# Patient Record
Sex: FEMALE | Race: BLACK OR AFRICAN AMERICAN | NOT HISPANIC OR LATINO | URBAN - METROPOLITAN AREA
[De-identification: names, ages, dates, MRNs, and addresses within clinical notes are randomized per-mention and may not be internally consistent; named-entity substitution may affect disease eponyms.]

---

## 2018-04-10 ENCOUNTER — EMERGENCY (EMERGENCY)
Facility: HOSPITAL | Age: 23
LOS: 0 days | Discharge: HOME | End: 2018-04-11
Attending: EMERGENCY MEDICINE | Admitting: INTERNAL MEDICINE

## 2018-04-10 VITALS
OXYGEN SATURATION: 98 % | HEART RATE: 93 BPM | RESPIRATION RATE: 20 BRPM | TEMPERATURE: 98 F | DIASTOLIC BLOOD PRESSURE: 56 MMHG | SYSTOLIC BLOOD PRESSURE: 122 MMHG

## 2018-04-10 DIAGNOSIS — R10.84 GENERALIZED ABDOMINAL PAIN: ICD-10-CM

## 2018-04-10 DIAGNOSIS — R74.0 NONSPECIFIC ELEVATION OF LEVELS OF TRANSAMINASE AND LACTIC ACID DEHYDROGENASE [LDH]: ICD-10-CM

## 2018-04-10 LAB
ALBUMIN SERPL ELPH-MCNC: 4.4 G/DL — SIGNIFICANT CHANGE UP (ref 3.5–5.2)
ALP SERPL-CCNC: 76 U/L — SIGNIFICANT CHANGE UP (ref 30–115)
ALT FLD-CCNC: 130 U/L — HIGH (ref 0–41)
ANION GAP SERPL CALC-SCNC: 13 MMOL/L — SIGNIFICANT CHANGE UP (ref 7–14)
APPEARANCE UR: (no result)
AST SERPL-CCNC: 222 U/L — HIGH (ref 0–41)
BACTERIA # UR AUTO: (no result) /HPF
BASOPHILS # BLD AUTO: 0.02 K/UL — SIGNIFICANT CHANGE UP (ref 0–0.2)
BASOPHILS NFR BLD AUTO: 0.3 % — SIGNIFICANT CHANGE UP (ref 0–1)
BILIRUB SERPL-MCNC: 1.4 MG/DL — HIGH (ref 0.2–1.2)
BILIRUB UR-MCNC: (no result)
BUN SERPL-MCNC: 11 MG/DL — SIGNIFICANT CHANGE UP (ref 10–20)
CALCIUM SERPL-MCNC: 9.1 MG/DL — SIGNIFICANT CHANGE UP (ref 8.5–10.1)
CHLORIDE SERPL-SCNC: 101 MMOL/L — SIGNIFICANT CHANGE UP (ref 98–110)
CO2 SERPL-SCNC: 27 MMOL/L — SIGNIFICANT CHANGE UP (ref 17–32)
COLOR SPEC: (no result)
CREAT SERPL-MCNC: 0.6 MG/DL — LOW (ref 0.7–1.5)
DIFF PNL FLD: NEGATIVE — SIGNIFICANT CHANGE UP
EOSINOPHIL # BLD AUTO: 0.2 K/UL — SIGNIFICANT CHANGE UP (ref 0–0.7)
EOSINOPHIL NFR BLD AUTO: 3.4 % — SIGNIFICANT CHANGE UP (ref 0–8)
EPI CELLS # UR: (no result) /HPF
GLUCOSE SERPL-MCNC: 96 MG/DL — SIGNIFICANT CHANGE UP (ref 70–99)
GLUCOSE UR QL: NEGATIVE — SIGNIFICANT CHANGE UP
HCT VFR BLD CALC: 42.6 % — SIGNIFICANT CHANGE UP (ref 37–47)
HGB BLD-MCNC: 13.8 G/DL — SIGNIFICANT CHANGE UP (ref 12–16)
IMM GRANULOCYTES NFR BLD AUTO: 0.2 % — SIGNIFICANT CHANGE UP (ref 0.1–0.3)
KETONES UR-MCNC: 15
LEUKOCYTE ESTERASE UR-ACNC: (no result)
LIDOCAIN IGE QN: 19 U/L — SIGNIFICANT CHANGE UP (ref 7–60)
LYMPHOCYTES # BLD AUTO: 1.88 K/UL — SIGNIFICANT CHANGE UP (ref 1.2–3.4)
LYMPHOCYTES # BLD AUTO: 31.8 % — SIGNIFICANT CHANGE UP (ref 20.5–51.1)
MCHC RBC-ENTMCNC: 27.2 PG — SIGNIFICANT CHANGE UP (ref 27–31)
MCHC RBC-ENTMCNC: 32.4 G/DL — SIGNIFICANT CHANGE UP (ref 32–37)
MCV RBC AUTO: 83.9 FL — SIGNIFICANT CHANGE UP (ref 81–99)
MONOCYTES # BLD AUTO: 0.51 K/UL — SIGNIFICANT CHANGE UP (ref 0.1–0.6)
MONOCYTES NFR BLD AUTO: 8.6 % — SIGNIFICANT CHANGE UP (ref 1.7–9.3)
NEUTROPHILS # BLD AUTO: 3.3 K/UL — SIGNIFICANT CHANGE UP (ref 1.4–6.5)
NEUTROPHILS NFR BLD AUTO: 55.7 % — SIGNIFICANT CHANGE UP (ref 42.2–75.2)
NITRITE UR-MCNC: NEGATIVE — SIGNIFICANT CHANGE UP
NRBC # BLD: 0 /100 WBCS — SIGNIFICANT CHANGE UP (ref 0–0)
PH UR: 7 — SIGNIFICANT CHANGE UP (ref 5–8)
PLATELET # BLD AUTO: 322 K/UL — SIGNIFICANT CHANGE UP (ref 130–400)
POTASSIUM SERPL-MCNC: 4.1 MMOL/L — SIGNIFICANT CHANGE UP (ref 3.5–5)
POTASSIUM SERPL-SCNC: 4.1 MMOL/L — SIGNIFICANT CHANGE UP (ref 3.5–5)
PROT SERPL-MCNC: 7.3 G/DL — SIGNIFICANT CHANGE UP (ref 6–8)
PROT UR-MCNC: 30
RBC # BLD: 5.08 M/UL — SIGNIFICANT CHANGE UP (ref 4.2–5.4)
RBC # FLD: 12.5 % — SIGNIFICANT CHANGE UP (ref 11.5–14.5)
SODIUM SERPL-SCNC: 141 MMOL/L — SIGNIFICANT CHANGE UP (ref 135–146)
SP GR SPEC: >=1.03 — SIGNIFICANT CHANGE UP (ref 1.01–1.03)
UROBILINOGEN FLD QL: 1 (ref 0.2–0.2)
WBC # BLD: 5.92 K/UL — SIGNIFICANT CHANGE UP (ref 4.8–10.8)
WBC # FLD AUTO: 5.92 K/UL — SIGNIFICANT CHANGE UP (ref 4.8–10.8)
WBC UR QL: (no result) /HPF

## 2018-04-10 RX ORDER — ONDANSETRON 8 MG/1
4 TABLET, FILM COATED ORAL ONCE
Qty: 0 | Refills: 0 | Status: COMPLETED | OUTPATIENT
Start: 2018-04-10 | End: 2018-04-10

## 2018-04-10 RX ORDER — KETOROLAC TROMETHAMINE 30 MG/ML
30 SYRINGE (ML) INJECTION ONCE
Qty: 0 | Refills: 0 | Status: DISCONTINUED | OUTPATIENT
Start: 2018-04-10 | End: 2018-04-10

## 2018-04-10 RX ADMIN — Medication 30 MILLIGRAM(S): at 23:24

## 2018-04-10 RX ADMIN — ONDANSETRON 4 MILLIGRAM(S): 8 TABLET, FILM COATED ORAL at 22:33

## 2018-04-10 RX ADMIN — Medication 20 MILLIGRAM(S): at 22:33

## 2018-04-10 RX ADMIN — Medication 30 MILLILITER(S): at 23:24

## 2018-04-10 NOTE — ED ADULT TRIAGE NOTE - CHIEF COMPLAINT QUOTE
pt c/o abdominal pain intermittent since her c section in may 2017 worsened 2 weeks ago. 1 episode of vomiting last night. No fever

## 2018-04-11 LAB — HCG SERPL-ACNC: <0.6 MIU/ML — SIGNIFICANT CHANGE UP (ref 0–5)

## 2018-04-11 NOTE — ED PROVIDER NOTE - MEDICAL DECISION MAKING DETAILS
Patient here for assessment of chronic, waxing and waning abdominal pain -- looks, well has soft abdomen -- will check labs, give GI cocktail and reassess

## 2018-04-11 NOTE — ED PROVIDER NOTE - OBJECTIVE STATEMENT
24 yo F, no chronic medical history, here for assessment of abdominal pain, described as diffuse and cramping -- sx waxing and waning since 5/2017. Occasionally associated with nausea, vomiting, recently with loose stools. Non bloody, no mucous.     Currently patient is pain free.

## 2018-04-11 NOTE — ED PROVIDER NOTE - NS ED ROS FT
Constitutional: no fever, chills, no recent weight loss, change in appetite or malaise  Cardiac: No chest pain, SOB or edema.  Respiratory: No cough or respiratory distress  GI: see HPI  : No dysuria, frequency, urgency or hematuria  MS: no pain to back or extremities, no loss of ROM, no weakness  Neuro: No headache or weakness. No LOC.  Skin: No skin rash.  Except as documented in the HPI, all other systems are negative.

## 2018-04-11 NOTE — ED PROVIDER NOTE - PROGRESS NOTE DETAILS
Labs notable for transaminitis only -- normal bili, normal alk phos, normal lipase, patient continues to have non tender abdomen, feels well after non narcotic analgesia. No signs of acute hepatitis. Will dc home with GI follow up for transaminitis, chronic abdominal pain. Discussed LFTs with patient -- she has a history of this in the past, was told she had to follow up with GI and she didnt

## 2018-04-11 NOTE — ED PROVIDER NOTE - PHYSICAL EXAMINATION
VITAL SIGNS: I have reviewed nursing notes and confirm.  CONSTITUTIONAL: Well-developed; well-nourished; in no acute distress.  SKIN: Skin exam is warm and dry, no acute rash.  HEAD: Normocephalic; atraumatic.  EYES: PERRL, EOM intact; conjunctiva and sclera clear.  ENT: No nasal discharge; airway clear  NECK: Supple; non tender.  CARD: Regular rate and rhythm.  RESP: No wheezes, rales or rhonchi.  ABD: Normal bowel sounds; soft; non-distended; non-tender; obese, no rebound, no guarding  EXT: Normal ROM. No clubbing, cyanosis or edema.  NEURO: Alert, oriented. Grossly unremarkable. No focal deficits.  PSYCH: Cooperative, appropriate.

## 2018-09-29 ENCOUNTER — EMERGENCY (EMERGENCY)
Facility: HOSPITAL | Age: 23
LOS: 0 days | Discharge: HOME | End: 2018-09-29
Admitting: PHYSICIAN ASSISTANT

## 2018-09-29 VITALS
DIASTOLIC BLOOD PRESSURE: 60 MMHG | SYSTOLIC BLOOD PRESSURE: 126 MMHG | HEART RATE: 88 BPM | TEMPERATURE: 98 F | RESPIRATION RATE: 20 BRPM | OXYGEN SATURATION: 97 %
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
## 2018-09-29 DIAGNOSIS — M54.2 CERVICALGIA: ICD-10-CM

## 2018-09-29 DIAGNOSIS — Z91.018 ALLERGY TO OTHER FOODS: ICD-10-CM
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
## 2018-09-29 DIAGNOSIS — Z91.013 ALLERGY TO SEAFOOD: ICD-10-CM
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
## 2018-09-29 RX ORDER — METHOCARBAMOL 500 MG/1
750 TABLET, FILM COATED ORAL ONCE
Qty: 0 | Refills: 0 | Status: COMPLETED | OUTPATIENT
Start: 2018-09-29 | End: 2018-09-29

## 2018-09-29 RX ORDER — METHOCARBAMOL 500 MG/1
1 TABLET, FILM COATED ORAL
Qty: 15 | Refills: 0
Start: 2018-09-29 | End: 2018-10-03

## 2018-09-29 RX ADMIN — METHOCARBAMOL 750 MILLIGRAM(S): 500 TABLET, FILM COATED ORAL at 18:01

## 2018-09-29 NOTE — ED PROVIDER NOTE - NS ED ROS FT
Review of Systems:  	•	CONSTITUTIONAL - no fever, no diaphoresis, no chills  	•	SKIN - no rash  	•	EYES - no eye pain, no blurry vision  	•	ENT - no change in hearing, no sore throat, no ear pain or tinnitus  	•	RESPIRATORY - no shortness of breath, no cough  	•	CARDIAC - no chest pain, no palpitations  	•	GI - no abd pain, no nausea, no vomiting, no diarrhea, no constipation  	•	GENITO-URINARY - no discharge, no dysuria; no hematuria, no increased urinary frequency  	•	MUSCULOSKELETAL - + neck pain   	•	NEUROLOGIC - no weakness, no headache, no paresthesias, no LOC
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
## 2018-09-29 NOTE — ED PROVIDER NOTE - PHYSICAL EXAMINATION
CONST: Well appearing in NAD  EYES: PERRL, EOMI, Sclera and conjunctiva clear.   ENT: No nasal discharge. TM's clear B/L without drainage. Oropharynx normal appearing, no erythema or exudates. Uvula midline.  NECK: + tenderness to left paraspinal , no spinal tenderness, normal ROM  CARD: Normal S1 S2; Normal rate and rhythm  RESP: Equal BS B/L, No wheezes, rhonchi or rales. No distress  GI: Soft, non-tender, non-distended.  MS: + tenderness to left trapezius, pulses 2 +, Normal ROM in all extremities. No midline spinal tenderness.  SKIN: Warm, dry, no acute rashes. Good turgor  NEURO: A&Ox3, No focal deficits. Strength 5/5 with no sensory deficits. Steady gait
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
## 2018-12-03 ENCOUNTER — OUTPATIENT (OUTPATIENT)
Dept: OUTPATIENT SERVICES | Facility: HOSPITAL | Age: 23
LOS: 1 days | Discharge: HOME | End: 2018-12-03

## 2018-12-03 DIAGNOSIS — M54.2 CERVICALGIA: ICD-10-CM

## 2018-12-03 DIAGNOSIS — M54.5 LOW BACK PAIN: ICD-10-CM

## 2019-04-27 ENCOUNTER — TRANSCRIPTION ENCOUNTER (OUTPATIENT)
Age: 24
End: 2019-04-27

## 2019-08-09 ENCOUNTER — TRANSCRIPTION ENCOUNTER (OUTPATIENT)
Age: 24
End: 2019-08-09

## 2019-08-17 ENCOUNTER — EMERGENCY (EMERGENCY)
Facility: HOSPITAL | Age: 24
LOS: 0 days | Discharge: HOME | End: 2019-08-18
Attending: EMERGENCY MEDICINE | Admitting: EMERGENCY MEDICINE
Payer: COMMERCIAL

## 2019-08-17 DIAGNOSIS — R00.0 TACHYCARDIA, UNSPECIFIED: ICD-10-CM

## 2019-08-17 DIAGNOSIS — R50.9 FEVER, UNSPECIFIED: ICD-10-CM

## 2019-08-17 DIAGNOSIS — R53.1 WEAKNESS: ICD-10-CM

## 2019-08-17 PROCEDURE — 99284 EMERGENCY DEPT VISIT MOD MDM: CPT

## 2019-08-17 PROCEDURE — 99053 MED SERV 10PM-8AM 24 HR FAC: CPT

## 2019-08-18 VITALS
RESPIRATION RATE: 18 BRPM | TEMPERATURE: 104 F | SYSTOLIC BLOOD PRESSURE: 120 MMHG | OXYGEN SATURATION: 98 % | HEART RATE: 115 BPM | DIASTOLIC BLOOD PRESSURE: 74 MMHG

## 2019-08-18 VITALS
OXYGEN SATURATION: 99 % | TEMPERATURE: 103 F | WEIGHT: 293 LBS | DIASTOLIC BLOOD PRESSURE: 64 MMHG | HEIGHT: 62 IN | HEART RATE: 104 BPM | RESPIRATION RATE: 18 BRPM | SYSTOLIC BLOOD PRESSURE: 138 MMHG

## 2019-08-18 LAB
ALBUMIN SERPL ELPH-MCNC: 4.2 G/DL — SIGNIFICANT CHANGE UP (ref 3.5–5.2)
ALP SERPL-CCNC: 58 U/L — SIGNIFICANT CHANGE UP (ref 30–115)
ALT FLD-CCNC: 15 U/L — SIGNIFICANT CHANGE UP (ref 0–41)
ANION GAP SERPL CALC-SCNC: 13 MMOL/L — SIGNIFICANT CHANGE UP (ref 7–14)
APPEARANCE UR: CLEAR — SIGNIFICANT CHANGE UP
AST SERPL-CCNC: 25 U/L — SIGNIFICANT CHANGE UP (ref 0–41)
BACTERIA # UR AUTO: ABNORMAL
BASOPHILS # BLD AUTO: 0.02 K/UL — SIGNIFICANT CHANGE UP (ref 0–0.2)
BASOPHILS NFR BLD AUTO: 0.4 % — SIGNIFICANT CHANGE UP (ref 0–1)
BILIRUB SERPL-MCNC: <0.2 MG/DL — SIGNIFICANT CHANGE UP (ref 0.2–1.2)
BILIRUB UR-MCNC: NEGATIVE — SIGNIFICANT CHANGE UP
BUN SERPL-MCNC: 11 MG/DL — SIGNIFICANT CHANGE UP (ref 10–20)
CALCIUM SERPL-MCNC: 9.3 MG/DL — SIGNIFICANT CHANGE UP (ref 8.5–10.1)
CHLORIDE SERPL-SCNC: 102 MMOL/L — SIGNIFICANT CHANGE UP (ref 98–110)
CO2 SERPL-SCNC: 22 MMOL/L — SIGNIFICANT CHANGE UP (ref 17–32)
COLOR SPEC: SIGNIFICANT CHANGE UP
CREAT SERPL-MCNC: 0.8 MG/DL — SIGNIFICANT CHANGE UP (ref 0.7–1.5)
DIFF PNL FLD: SIGNIFICANT CHANGE UP
EOSINOPHIL # BLD AUTO: 0.07 K/UL — SIGNIFICANT CHANGE UP (ref 0–0.7)
EOSINOPHIL NFR BLD AUTO: 1.3 % — SIGNIFICANT CHANGE UP (ref 0–8)
EPI CELLS # UR: 4 /HPF — SIGNIFICANT CHANGE UP (ref 0–5)
GLUCOSE SERPL-MCNC: 117 MG/DL — HIGH (ref 70–99)
GLUCOSE UR QL: NEGATIVE — SIGNIFICANT CHANGE UP
HCG SERPL-ACNC: <0.6 MIU/ML — SIGNIFICANT CHANGE UP
HCT VFR BLD CALC: 40.7 % — SIGNIFICANT CHANGE UP (ref 37–47)
HGB BLD-MCNC: 13.4 G/DL — SIGNIFICANT CHANGE UP (ref 12–16)
HYALINE CASTS # UR AUTO: 0 /LPF — SIGNIFICANT CHANGE UP (ref 0–7)
IMM GRANULOCYTES NFR BLD AUTO: 0.2 % — SIGNIFICANT CHANGE UP (ref 0.1–0.3)
KETONES UR-MCNC: NEGATIVE — SIGNIFICANT CHANGE UP
LACTATE SERPL-SCNC: 1 MMOL/L — SIGNIFICANT CHANGE UP (ref 0.5–2.2)
LEUKOCYTE ESTERASE UR-ACNC: ABNORMAL
LYMPHOCYTES # BLD AUTO: 0.89 K/UL — LOW (ref 1.2–3.4)
LYMPHOCYTES # BLD AUTO: 17 % — LOW (ref 20.5–51.1)
MCHC RBC-ENTMCNC: 27.1 PG — SIGNIFICANT CHANGE UP (ref 27–31)
MCHC RBC-ENTMCNC: 32.9 G/DL — SIGNIFICANT CHANGE UP (ref 32–37)
MCV RBC AUTO: 82.4 FL — SIGNIFICANT CHANGE UP (ref 81–99)
MONOCYTES # BLD AUTO: 0.42 K/UL — SIGNIFICANT CHANGE UP (ref 0.1–0.6)
MONOCYTES NFR BLD AUTO: 8 % — SIGNIFICANT CHANGE UP (ref 1.7–9.3)
NEUTROPHILS # BLD AUTO: 3.83 K/UL — SIGNIFICANT CHANGE UP (ref 1.4–6.5)
NEUTROPHILS NFR BLD AUTO: 73.1 % — SIGNIFICANT CHANGE UP (ref 42.2–75.2)
NITRITE UR-MCNC: NEGATIVE — SIGNIFICANT CHANGE UP
NRBC # BLD: 0 /100 WBCS — SIGNIFICANT CHANGE UP (ref 0–0)
PH UR: 7.5 — SIGNIFICANT CHANGE UP (ref 5–8)
PLATELET # BLD AUTO: 262 K/UL — SIGNIFICANT CHANGE UP (ref 130–400)
POTASSIUM SERPL-MCNC: 4.4 MMOL/L — SIGNIFICANT CHANGE UP (ref 3.5–5)
POTASSIUM SERPL-SCNC: 4.4 MMOL/L — SIGNIFICANT CHANGE UP (ref 3.5–5)
PROT SERPL-MCNC: 7.2 G/DL — SIGNIFICANT CHANGE UP (ref 6–8)
PROT UR-MCNC: NEGATIVE — SIGNIFICANT CHANGE UP
RBC # BLD: 4.94 M/UL — SIGNIFICANT CHANGE UP (ref 4.2–5.4)
RBC # FLD: 12.6 % — SIGNIFICANT CHANGE UP (ref 11.5–14.5)
RBC CASTS # UR COMP ASSIST: 1 /HPF — SIGNIFICANT CHANGE UP (ref 0–4)
SODIUM SERPL-SCNC: 137 MMOL/L — SIGNIFICANT CHANGE UP (ref 135–146)
SP GR SPEC: 1.02 — SIGNIFICANT CHANGE UP (ref 1.01–1.02)
UROBILINOGEN FLD QL: SIGNIFICANT CHANGE UP
WBC # BLD: 5.24 K/UL — SIGNIFICANT CHANGE UP (ref 4.8–10.8)
WBC # FLD AUTO: 5.24 K/UL — SIGNIFICANT CHANGE UP (ref 4.8–10.8)
WBC UR QL: 6 /HPF — HIGH (ref 0–5)

## 2019-08-18 PROCEDURE — 71046 X-RAY EXAM CHEST 2 VIEWS: CPT | Mod: 26

## 2019-08-18 PROCEDURE — 93010 ELECTROCARDIOGRAM REPORT: CPT

## 2019-08-18 RX ORDER — IBUPROFEN 200 MG
800 TABLET ORAL ONCE
Refills: 0 | Status: COMPLETED | OUTPATIENT
Start: 2019-08-18 | End: 2019-08-18

## 2019-08-18 RX ORDER — ACETAMINOPHEN 500 MG
975 TABLET ORAL ONCE
Refills: 0 | Status: COMPLETED | OUTPATIENT
Start: 2019-08-18 | End: 2019-08-18

## 2019-08-18 RX ORDER — SODIUM CHLORIDE 9 MG/ML
4400 INJECTION, SOLUTION INTRAVENOUS ONCE
Refills: 0 | Status: DISCONTINUED | OUTPATIENT
Start: 2019-08-18 | End: 2019-08-18

## 2019-08-18 RX ADMIN — Medication 800 MILLIGRAM(S): at 01:35

## 2019-08-18 RX ADMIN — Medication 975 MILLIGRAM(S): at 03:11

## 2019-08-18 RX ADMIN — Medication 800 MILLIGRAM(S): at 00:58

## 2019-08-18 NOTE — ED PROVIDER NOTE - OBJECTIVE STATEMENT
Pt is a 25y/o female presents today for eval of fever and generalized weakness x 3 days. Pt sts she hasn't taken anything for her fever. Pt denies fever, chills, recent travel, dysuria, hematuria, n/v/d, CP, SOB, Leg swelling, abd pain.

## 2019-08-18 NOTE — ED PROVIDER NOTE - CLINICAL SUMMARY MEDICAL DECISION MAKING FREE TEXT BOX
evaluated for fever, well appearing, labs nml without signs of sepsis. VS improved, likely viral illness.

## 2019-08-18 NOTE — ED PROVIDER NOTE - NS ED ROS FT
Eyes:  No visual changes, eye pain or discharge.  ENMT:  No hearing changes, pain, discharge or infections. No neck pain or stiffness.  Cardiac:  No chest pain, SOB or edema. No chest pain with exertion.  Respiratory:  No cough or respiratory distress. No hemoptysis. No history of asthma or RAD.  GI:  No nausea, vomiting, diarrhea or abdominal pain.  :  No dysuria, frequency or burning.  MS:  No myalgia, muscle weakness, joint pain or back pain.  Neuro:  No headache or weakness.  No LOC.  Skin:  No skin rash.   Endocrine: No history of thyroid disease or diabetes.  Except as documented in the HPI,  all other systems are negative.

## 2019-08-18 NOTE — ED PROVIDER NOTE - PHYSICAL EXAMINATION
VITAL SIGNS: I have reviewed nursing notes and confirm.  CONSTITUTIONAL: Well-developed; well-nourished; in no acute distress.   SKIN: skin exam is warm and dry, no acute rash.    HEAD: Normocephalic; atraumatic.  EYES: conjunctiva and sclera clear.  ENT: No nasal discharge; airway clear.  CARD: S1, S2 normal; no murmurs, gallops, or rubs. Regular rate and rhythm.   RESP: No wheezes, rales or rhonchi.  ABD: Normal bowel sounds; soft; non-distended; non-tender  EXT: Normal ROM.  No clubbing, cyanosis or edema.   LYMPH: No acute cervical adenopathy.  NEURO: Alert, oriented, grossly unremarkable

## 2019-08-18 NOTE — ED PROVIDER NOTE - NSFOLLOWUPINSTRUCTIONS_ED_ALL_ED_FT
Fever, Adult    A fever is an increase in the body's temperature. It is usually defined as a temperature of 100.4°F (38°C) or higher. Brief mild or moderate fevers generally have no long-term effects, and they often do not require treatment. Moderate or high fevers may make you feel uncomfortable and can sometimes be a sign of a serious illness or disease. The sweating that may occur with repeated or prolonged fever may also cause dehydration.    Fever is confirmed by taking a temperature with a thermometer. A measured temperature can vary with:    Age.  Time of day.  Location of the thermometer:  Mouth (oral).  Rectum (rectal).  Ear (tympanic).  Underarm (axillary).  Forehead (temporal).    HOME CARE INSTRUCTIONS  Pay attention to any changes in your symptoms. Take these actions to help with your condition:    Take over-the counter and prescription medicines only as told by your health care provider. Follow the dosing instructions carefully.  If you were prescribed an antibiotic medicine, take it as told by your health care provider. Do not stop taking the antibiotic even if you start to feel better.  Rest as needed.  Drink enough fluid to keep your urine clear or pale yellow. This helps to prevent dehydration.  Sponge yourself or bathe with room-temperature water to help reduce your body temperature as needed. Do not use ice water.   Do not overbundle yourself in blankets or heavy clothes.    SEEK MEDICAL CARE IF:  You cannot eat or drink without vomiting.  You have pain when you urinate.  Your symptoms do not improve with treatment.  You develop new symptoms.  You develop excessive weakness.    SEEK IMMEDIATE MEDICAL CARE IF:  You have shortness of breath or have trouble breathing.  You are dizzy or you faint.  You are disoriented or confused.  You develop signs of dehydration, such as a dry mouth, decreased urination, or paleness.  You develop severe pain in your abdomen.  You have persistent vomiting or diarrhea.  You develop a skin rash.  Your symptoms suddenly get worse.    ADDITIONAL NOTES AND INSTRUCTIONS    Please follow up with your Primary MD in 24-48 hr.  Seek immediate medical care for any new/worsening signs or symptoms

## 2019-08-18 NOTE — ED PROVIDER NOTE - ATTENDING CONTRIBUTION TO CARE
fever for 3 days, with mild sore throat and dizziness, otherwise acting baseline, no other cough, vomiting/diarrhea but has some nausea. on exam tachy and febrile, well appearing, throat nml, lungs cta, abd soft, heart nml. plan is to hydrate, check labs and reasses.

## 2019-08-19 LAB
CULTURE RESULTS: SIGNIFICANT CHANGE UP
SPECIMEN SOURCE: SIGNIFICANT CHANGE UP

## 2019-10-08 ENCOUNTER — TRANSCRIPTION ENCOUNTER (OUTPATIENT)
Age: 24
End: 2019-10-08

## 2020-08-25 ENCOUNTER — TRANSCRIPTION ENCOUNTER (OUTPATIENT)
Age: 25
End: 2020-08-25

## 2021-10-23 ENCOUNTER — TRANSCRIPTION ENCOUNTER (OUTPATIENT)
Age: 26
End: 2021-10-23

## 2022-04-27 ENCOUNTER — TRANSCRIPTION ENCOUNTER (OUTPATIENT)
Age: 27
End: 2022-04-27

## 2022-10-13 ENCOUNTER — NON-APPOINTMENT (OUTPATIENT)
Age: 27
End: 2022-10-13

## 2023-02-28 ENCOUNTER — LABORATORY RESULT (OUTPATIENT)
Age: 28
End: 2023-02-28

## 2023-03-01 ENCOUNTER — APPOINTMENT (OUTPATIENT)
Dept: OBGYN | Facility: CLINIC | Age: 28
End: 2023-03-01
Payer: MEDICAID

## 2023-03-01 VITALS — HEIGHT: 62 IN | BODY MASS INDEX: 51.16 KG/M2 | WEIGHT: 278 LBS

## 2023-03-01 PROBLEM — Z00.00 ENCOUNTER FOR PREVENTIVE HEALTH EXAMINATION: Status: ACTIVE | Noted: 2023-03-01

## 2023-03-01 LAB
BILIRUB UR QL STRIP: NEGATIVE
CLARITY UR: CLEAR
COLLECTION METHOD: NORMAL
GLUCOSE UR-MCNC: NEGATIVE
HCG UR QL: 0.2 EU/DL
HGB UR QL STRIP.AUTO: NEGATIVE
KETONES UR-MCNC: NEGATIVE
LEUKOCYTE ESTERASE UR QL STRIP: NEGATIVE
NITRITE UR QL STRIP: NEGATIVE
PH UR STRIP: 6
PROT UR STRIP-MCNC: NEGATIVE
SP GR UR STRIP: 1.02

## 2023-03-01 PROCEDURE — 0500F INITIAL PRENATAL CARE VISIT: CPT

## 2023-03-07 LAB
CYTOLOGY CVX/VAG DOC THIN PREP: NORMAL
GP B STREP DNA SPEC QL NAA+PROBE: NOT DETECTED
SOURCE GBS: NORMAL

## 2023-03-15 ENCOUNTER — NON-APPOINTMENT (OUTPATIENT)
Age: 28
End: 2023-03-15

## 2023-03-31 ENCOUNTER — NON-APPOINTMENT (OUTPATIENT)
Age: 28
End: 2023-03-31

## 2023-04-03 ENCOUNTER — NON-APPOINTMENT (OUTPATIENT)
Age: 28
End: 2023-04-03

## 2023-04-04 LAB
ABO + RH PNL BLD: NORMAL
BASOPHILS # BLD AUTO: 0.03 K/UL
BASOPHILS NFR BLD AUTO: 0.4 %
BLD GP AB SCN SERPL QL: NORMAL
EOSINOPHIL # BLD AUTO: 0.14 K/UL
EOSINOPHIL NFR BLD AUTO: 1.7 %
HCT VFR BLD CALC: 37.7 %
HGB BLD-MCNC: 12.4 G/DL
IMM GRANULOCYTES NFR BLD AUTO: 0.6 %
LYMPHOCYTES # BLD AUTO: 2.16 K/UL
LYMPHOCYTES NFR BLD AUTO: 26.4 %
MAN DIFF?: NORMAL
MCHC RBC-ENTMCNC: 28.5 PG
MCHC RBC-ENTMCNC: 32.9 G/DL
MCV RBC AUTO: 86.7 FL
MONOCYTES # BLD AUTO: 0.57 K/UL
MONOCYTES NFR BLD AUTO: 7 %
NEUTROPHILS # BLD AUTO: 5.22 K/UL
NEUTROPHILS NFR BLD AUTO: 63.9 %
PLATELET # BLD AUTO: 284 K/UL
RBC # BLD: 4.35 M/UL
RBC # FLD: 12.7 %
WBC # FLD AUTO: 8.17 K/UL

## 2023-04-05 ENCOUNTER — APPOINTMENT (OUTPATIENT)
Dept: OBGYN | Facility: CLINIC | Age: 28
End: 2023-04-05
Payer: MEDICAID

## 2023-04-05 VITALS
SYSTOLIC BLOOD PRESSURE: 122 MMHG | DIASTOLIC BLOOD PRESSURE: 74 MMHG | BODY MASS INDEX: 51.71 KG/M2 | HEART RATE: 110 BPM | WEIGHT: 281 LBS | TEMPERATURE: 98.6 F | HEIGHT: 62 IN | OXYGEN SATURATION: 98 %

## 2023-04-05 PROCEDURE — 0502F SUBSEQUENT PRENATAL CARE: CPT

## 2023-04-06 ENCOUNTER — NON-APPOINTMENT (OUTPATIENT)
Age: 28
End: 2023-04-06

## 2023-04-12 ENCOUNTER — NON-APPOINTMENT (OUTPATIENT)
Age: 28
End: 2023-04-12

## 2023-04-12 LAB — PROGEST SERPL-MCNC: 11.6 NG/ML

## 2023-04-12 RX ORDER — PROGESTERONE 200 MG/1
200 CAPSULE ORAL
Qty: 30 | Refills: 2 | Status: ACTIVE | COMMUNITY
Start: 2023-04-12 | End: 1900-01-01

## 2023-04-19 ENCOUNTER — NON-APPOINTMENT (OUTPATIENT)
Age: 28
End: 2023-04-19

## 2023-04-19 LAB
BACTERIA UR CULT: NORMAL
HBV SURFACE AG SER QL: NONREACTIVE
HCV AB SER QL: NONREACTIVE
HCV S/CO RATIO: 0.1 S/CO
HGB A MFR BLD: 97.3 %
HGB A2 MFR BLD: 2.7 %
HGB FRACT BLD-IMP: NORMAL
HIV1+2 AB SPEC QL IA.RAPID: NONREACTIVE
LEAD BLD-MCNC: <1 UG/DL
MEV IGG FLD QL IA: 82.9 AU/ML
MEV IGG+IGM SER-IMP: POSITIVE
RUBV IGG FLD-ACNC: 1.4 INDEX
RUBV IGG SER-IMP: POSITIVE
T PALLIDUM AB SER QL IA: NEGATIVE
VZV AB TITR SER: POSITIVE
VZV IGG SER IF-ACNC: 230.8 INDEX

## 2023-04-20 ENCOUNTER — NON-APPOINTMENT (OUTPATIENT)
Age: 28
End: 2023-04-20

## 2023-04-24 LAB
CHROMOSOME13 INTERPRETATION: NORMAL
CHROMOSOME13 TEST RESULT: NORMAL
CHROMOSOME18 INTERPRETATION: NORMAL
CHROMOSOME18 TEST RESULT: NORMAL
CHROMOSOME21 INTERPRETATION: NORMAL
CHROMOSOME21 TEST RESULT: NORMAL
FETAL FRACTION: NORMAL
MICRODELETIONS INTERPRETATION: NORMAL
MICRODELETIONS RESULT: NORMAL
PERFORMANCE AND LIMITATIONS: NORMAL
SEX CHROMOSOME INTERPRETATION: NORMAL
SEX CHROMOSOME TEST RESULT: NORMAL
VERIFI WITH MICRODELETIONS: NOT DETECTED

## 2023-05-02 ENCOUNTER — APPOINTMENT (OUTPATIENT)
Dept: OBGYN | Facility: CLINIC | Age: 28
End: 2023-05-02
Payer: MEDICAID

## 2023-05-02 VITALS
HEIGHT: 62 IN | BODY MASS INDEX: 51.71 KG/M2 | DIASTOLIC BLOOD PRESSURE: 60 MMHG | HEART RATE: 98 BPM | TEMPERATURE: 97.8 F | WEIGHT: 281 LBS | OXYGEN SATURATION: 98 % | SYSTOLIC BLOOD PRESSURE: 104 MMHG

## 2023-05-02 PROCEDURE — 0502F SUBSEQUENT PRENATAL CARE: CPT

## 2023-05-24 ENCOUNTER — APPOINTMENT (OUTPATIENT)
Dept: OBGYN | Facility: CLINIC | Age: 28
End: 2023-05-24
Payer: MEDICAID

## 2023-05-24 ENCOUNTER — TRANSCRIPTION ENCOUNTER (OUTPATIENT)
Age: 28
End: 2023-05-24

## 2023-05-24 VITALS
TEMPERATURE: 98.4 F | DIASTOLIC BLOOD PRESSURE: 66 MMHG | BODY MASS INDEX: 52.49 KG/M2 | SYSTOLIC BLOOD PRESSURE: 120 MMHG | WEIGHT: 287 LBS | OXYGEN SATURATION: 98 % | HEART RATE: 81 BPM

## 2023-05-24 PROCEDURE — 0502F SUBSEQUENT PRENATAL CARE: CPT

## 2023-05-25 ENCOUNTER — NON-APPOINTMENT (OUTPATIENT)
Age: 28
End: 2023-05-25

## 2023-05-31 ENCOUNTER — APPOINTMENT (OUTPATIENT)
Dept: CARDIOLOGY | Facility: CLINIC | Age: 28
End: 2023-05-31

## 2023-06-19 ENCOUNTER — APPOINTMENT (OUTPATIENT)
Dept: PEDIATRIC CARDIOLOGY | Facility: CLINIC | Age: 28
End: 2023-06-19
Payer: MEDICAID

## 2023-06-19 PROCEDURE — 76827 ECHO EXAM OF FETAL HEART: CPT

## 2023-06-19 PROCEDURE — 99205 OFFICE O/P NEW HI 60 MIN: CPT | Mod: 25

## 2023-06-19 PROCEDURE — 76825 ECHO EXAM OF FETAL HEART: CPT

## 2023-06-19 PROCEDURE — 93325 DOPPLER ECHO COLOR FLOW MAPG: CPT

## 2023-06-19 NOTE — HISTORY OF PRESENT ILLNESS
[FreeTextEntry1] : Dear Dr. MATT ALVARADO,\par \par I had the pleasure of seeing your patient, MARILYN WATERMAN, in my office today, 06/19/2023. She was referred to pediatric cardiology for fetal echocardiogram.\par \par Limited visualization of heart on routine imaging\par MARILYN has been doing well from a clinical standpoint.\par There is no family history of congenital heart disease.\par \par Today's echocardiogram was normal. Please see attached report.\par \par \par

## 2023-06-19 NOTE — DISCUSSION/SUMMARY
[FreeTextEntry1] : Normal fetal echocardiogram\par Reassurance\par Follow up PRN; routine prenatal care and delivery\par Discussed findings and limitations; please see report\par \par Please do not hesitate to contact me if you have any questions.\par \par Enmanuel Paul MD, MS, FAAP, FACC\par Attending Physician, Pediatric Cardiology\par [facility]\par [address]\[place] [address]\par Office: [phone]\par Fax: [phone]\par Email: [email or web address]\par \par \par I have spent 60 minutes of time on the encounter excluding separately reported services.\par \par

## 2023-06-28 ENCOUNTER — APPOINTMENT (OUTPATIENT)
Dept: OBGYN | Facility: CLINIC | Age: 28
End: 2023-06-28
Payer: MEDICAID

## 2023-06-28 VITALS
TEMPERATURE: 98 F | HEIGHT: 62 IN | DIASTOLIC BLOOD PRESSURE: 70 MMHG | OXYGEN SATURATION: 98 % | WEIGHT: 292 LBS | BODY MASS INDEX: 53.73 KG/M2 | SYSTOLIC BLOOD PRESSURE: 128 MMHG | HEART RATE: 91 BPM

## 2023-06-28 DIAGNOSIS — Z13.1 ENCOUNTER FOR SCREENING FOR DIABETES MELLITUS: ICD-10-CM

## 2023-06-28 DIAGNOSIS — Z11.3 ENCOUNTER FOR SCREENING FOR INFECTIONS WITH A PREDOMINANTLY SEXUAL MODE OF TRANSMISSION: ICD-10-CM

## 2023-06-28 DIAGNOSIS — Z13.0 ENCOUNTER FOR SCREENING FOR DISEASES OF THE BLOOD AND BLOOD-FORMING ORGANS AND CERTAIN DISORDERS INVOLVING THE IMMUNE MECHANISM: ICD-10-CM

## 2023-06-28 PROCEDURE — 0502F SUBSEQUENT PRENATAL CARE: CPT

## 2023-06-28 RX ORDER — ALBUTEROL SULFATE 90 UG/1
108 (90 BASE) INHALANT RESPIRATORY (INHALATION)
Qty: 1 | Refills: 2 | Status: ACTIVE | COMMUNITY
Start: 2023-06-28 | End: 1900-01-01

## 2023-07-07 ENCOUNTER — NON-APPOINTMENT (OUTPATIENT)
Age: 28
End: 2023-07-07

## 2023-07-08 LAB — GLUCOSE 1H P 100 G GLC PO SERPL-MCNC: 112 MG/DL

## 2023-07-09 LAB — T PALLIDUM AB SER QL IA: NEGATIVE

## 2023-07-10 ENCOUNTER — NON-APPOINTMENT (OUTPATIENT)
Age: 28
End: 2023-07-10

## 2023-07-10 ENCOUNTER — APPOINTMENT (OUTPATIENT)
Dept: CARDIOLOGY | Facility: CLINIC | Age: 28
End: 2023-07-10
Payer: MEDICAID

## 2023-07-10 ENCOUNTER — OUTPATIENT (OUTPATIENT)
Dept: OUTPATIENT SERVICES | Facility: HOSPITAL | Age: 28
LOS: 1 days | End: 2023-07-10
Payer: MEDICAID

## 2023-07-10 ENCOUNTER — APPOINTMENT (OUTPATIENT)
Dept: ANTEPARTUM | Facility: CLINIC | Age: 28
End: 2023-07-10
Payer: MEDICAID

## 2023-07-10 VITALS
WEIGHT: 293 LBS | BODY MASS INDEX: 53.92 KG/M2 | TEMPERATURE: 97 F | SYSTOLIC BLOOD PRESSURE: 117 MMHG | OXYGEN SATURATION: 100 % | HEIGHT: 62 IN | RESPIRATION RATE: 18 BRPM | DIASTOLIC BLOOD PRESSURE: 82 MMHG

## 2023-07-10 VITALS
TEMPERATURE: 98.5 F | SYSTOLIC BLOOD PRESSURE: 127 MMHG | WEIGHT: 293 LBS | HEART RATE: 89 BPM | DIASTOLIC BLOOD PRESSURE: 56 MMHG | OXYGEN SATURATION: 99 % | BODY MASS INDEX: 53.66 KG/M2

## 2023-07-10 VITALS — HEART RATE: 93 BPM

## 2023-07-10 DIAGNOSIS — O09.93 SUPERVISION OF HIGH RISK PREGNANCY, UNSPECIFIED, THIRD TRIMESTER: ICD-10-CM

## 2023-07-10 DIAGNOSIS — R06.02 SHORTNESS OF BREATH: ICD-10-CM

## 2023-07-10 DIAGNOSIS — Z78.9 OTHER SPECIFIED HEALTH STATUS: ICD-10-CM

## 2023-07-10 LAB
BILIRUB UR QL STRIP: NORMAL
BP DIAS: 56 MM HG
BP SYS: 127 MM HG
CLARITY UR: CLEAR
COLLECTION METHOD: NORMAL
FETAL MOVEMENT: PRESENT
GLUCOSE UR-MCNC: NORMAL
HCG UR QL: 0.2 EU/DL
HGB UR QL STRIP.AUTO: NORMAL
KETONES UR-MCNC: NORMAL
LEUKOCYTE ESTERASE UR QL STRIP: NORMAL
NITRITE UR QL STRIP: NORMAL
OB COMMENTS: NORMAL
PH UR STRIP: 6.5
PROT UR STRIP-MCNC: NORMAL
SCHEDULED VISIT: YES
SP GR UR STRIP: 1.02
URINE ALBUMIN/PROTEIN: NORMAL
URINE GLUCOSE: NORMAL
URINE KETONES: NORMAL
WEEKS GESTATION: 24

## 2023-07-10 PROCEDURE — 99204 OFFICE O/P NEW MOD 45 MIN: CPT | Mod: 25

## 2023-07-10 PROCEDURE — 99204 OFFICE O/P NEW MOD 45 MIN: CPT

## 2023-07-10 PROCEDURE — 93000 ELECTROCARDIOGRAM COMPLETE: CPT

## 2023-07-10 PROCEDURE — 81002 URINALYSIS NONAUTO W/O SCOPE: CPT

## 2023-07-10 NOTE — DISCUSSION/SUMMARY
[FreeTextEntry1] : Patient is a 27 y/o  at 24w0d GA BECCA 10/30/23 by first trimester sonogram presenting for consultation for asthma, h/o gastric sleeve, obesity, h/o previous  section. Currently managed by Dr. Davis.\par Patient is doing well. She denies contractions, leakage of fluid, vaginal bleeding. Endorses good fetal movement. \par States her asthma is very mild, with last exacerbation years ago, never hospitalized or intubated, last used her albuterol 2 months ago.\par Pt is s/p gastric sleeve in  with 120 weight loss. 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 Denies, smoking, alcohol or drug use\par Psych: denies\par Genetics: denies h/o genetic abnormalities\par \par Physical Exam: BP: 127/56 , HR: 89 bpm, O2sat: 99 %, Wt: 293.6 lbs, Temp: 98.5 F, \par Udip: neg\par GA: AOx3, NAD\par Heart: RRR, no M/R/G\par Lungs: CTAB\par Abd: soft, nontender, nondistended\par LE: no erythema, edema or pain\par \par Labs: \par  \par  AFP screen negative 1.27 MoM (done at [facility], results on phone)\par 4/15 rubella immune, HepBsAg NR, RPR NR, HCV NR, HIV NR, NIPT low risk male\par \par OBUS\par  22w0d breech, posterior placenta, EFW 507gm (68%ile), MCA and UAD wnl\par  normal fetal echo\par 6/15 20w1d EFW 327gm (35%ile), normal anatomy, limited views due to maternal body habitus (cardiac views)\par  17w0d EFW 170gm (31%ile), \par  13w6d SIUP\par  12w1d NT 1.8mm\par 3/15 7w0d SIUP\par \par Maternal studies:\par 7/10 EKG NSR\par \par A/P Patient is a 29yo  at 24w0d GA BECCA 10/30/23 by first trimester sonogram presenting for consultation for asthma, h/o gastric sleeve, obesity, h/o previous  section. Currently managed by Dr. Davis.\par \par #Obesity\par -We discussed that obesity is associated with a host of pregnancy complications.  \par -The patient is at increased risk for preeclampsia and gestational hypertension,  delivery, macrosomia, gestational diabetes, deep venous thrombosis,  delivery, stillbirth and certain birth defects, such as open neural tube defects. \par - We recommend weekly biophysical profiles to start at around 34 weeks gestation. \par - Patient evaluated by our nutritionist today.\par - S/p early GCT, advised to repeat at 27-28 weeks GA.\par \par #Gastric Sleeve\par - Advised prenatal vitamins.\par - Growth sonograms q4 weeks.\par - Aspirin is usually avoided in patients with gastric bypass as it is a relative contraindication. \par \par #H/o  section. \par - Posterior placenta.\par - Patient does not desire TOLAC. States repeat c/s scheduled for 10/23.\par \par #Asthma, mild intermittent. \par In general, the effect of pregnancy on asthma is favorable with a majority of patients is either stable or worsened.  The effect of asthma on her fetus depends on the degree of her asthmatic control and oxygenation.  Maternal hypoxia may be associated with fetal growth restriction and fetal hypoxia.  Patients requiring “rescue” therapy more than twice a week benefit from maintenance therapy with steroid inhalers.  Fetal risks associated with under controlled asthma include IUGR, IUFD, and preeclampsia. \par Given the patient's current level of control, recommend to monitor for worsening and have a  low threshold for treatment as indicated. \par \par #Pregnancy \par - Care per Dr. Davis\par \par Recommend follow-up in 8 weeks for visit and initiation of fetal surveillance.

## 2023-07-10 NOTE — HISTORY OF PRESENT ILLNESS
[FreeTextEntry1] : MARILYN WATERMAN is a 28 year old woman  who presents today, 24 weeks pregnant and planned  10/23/23. \par \par The patient denies chest pain, palpitations and syncope. She has some shortness of breath with exertion. She had some foot and ankle swelling this weekend. No orthopnea and PND. \par \par OBGYN hx: This is her second pregnancy. Her first pregnancy was uncomplicated. She had a  after 3 days of labor. \par

## 2023-07-10 NOTE — ASSESSMENT
[FreeTextEntry1] : 28 year old woman  who presents today, 24 weeks pregnant and planned  10/23/23. \par \par 1. Shortness of breath on exertion: with ankle edema, most likely a physiological part of pregnancy. However, will do an echocardiogram for further evaluation. EKG today shows normal sinus rhythm. Patient feeling well otherwise. Will see her once in the third trimester as she has a planned  and will follow with her within one month of delivery. \par \par The primary prevention of heart disease was discussed in detail with the patient, including adhering to a heart healthy, plant based, or Mediterranean diet, and the importance of 30 minutes of moderate intensity activity for 30 minutes, 5 times a week. All the patient's questions were answered.\par \par RTC in 2 months.

## 2023-07-12 DIAGNOSIS — O99.210 OBESITY COMPLICATING PREGNANCY, UNSPECIFIED TRIMESTER: ICD-10-CM

## 2023-07-12 DIAGNOSIS — O34.218 MATERNAL CARE FOR OTHER TYPE SCAR FROM PREVIOUS CESAREAN DELIVERY: ICD-10-CM

## 2023-07-12 DIAGNOSIS — Z3A.24 24 WEEKS GESTATION OF PREGNANCY: ICD-10-CM

## 2023-07-12 DIAGNOSIS — O99.519 DISEASES OF THE RESPIRATORY SYSTEM COMPLICATING PREGNANCY, UNSPECIFIED TRIMESTER: ICD-10-CM

## 2023-07-13 ENCOUNTER — APPOINTMENT (OUTPATIENT)
Dept: OBGYN | Facility: CLINIC | Age: 28
End: 2023-07-13
Payer: MEDICAID

## 2023-07-13 VITALS
OXYGEN SATURATION: 98 % | SYSTOLIC BLOOD PRESSURE: 120 MMHG | DIASTOLIC BLOOD PRESSURE: 80 MMHG | HEART RATE: 85 BPM | TEMPERATURE: 98 F | HEIGHT: 62 IN

## 2023-07-13 PROCEDURE — 0502F SUBSEQUENT PRENATAL CARE: CPT

## 2023-07-22 ENCOUNTER — APPOINTMENT (OUTPATIENT)
Dept: CARDIOLOGY | Facility: CLINIC | Age: 28
End: 2023-07-22

## 2023-07-28 ENCOUNTER — APPOINTMENT (OUTPATIENT)
Dept: OBGYN | Facility: CLINIC | Age: 28
End: 2023-07-28
Payer: MEDICAID

## 2023-07-28 DIAGNOSIS — Z13.1 ENCOUNTER FOR SCREENING FOR DIABETES MELLITUS: ICD-10-CM

## 2023-07-28 PROCEDURE — 59025 FETAL NON-STRESS TEST: CPT

## 2023-07-28 PROCEDURE — 0502F SUBSEQUENT PRENATAL CARE: CPT

## 2023-08-04 ENCOUNTER — APPOINTMENT (OUTPATIENT)
Dept: ANTEPARTUM | Facility: CLINIC | Age: 28
End: 2023-08-04

## 2023-08-04 ENCOUNTER — APPOINTMENT (OUTPATIENT)
Dept: MATERNAL FETAL MEDICINE | Facility: CLINIC | Age: 28
End: 2023-08-04

## 2023-08-07 ENCOUNTER — NON-APPOINTMENT (OUTPATIENT)
Age: 28
End: 2023-08-07

## 2023-08-09 ENCOUNTER — OUTPATIENT (OUTPATIENT)
Dept: OUTPATIENT SERVICES | Facility: HOSPITAL | Age: 28
LOS: 1 days | End: 2023-08-09
Payer: MEDICAID

## 2023-08-09 ENCOUNTER — APPOINTMENT (OUTPATIENT)
Dept: ANTEPARTUM | Facility: CLINIC | Age: 28
End: 2023-08-09
Payer: MEDICAID

## 2023-08-09 ENCOUNTER — ASOB RESULT (OUTPATIENT)
Age: 28
End: 2023-08-09

## 2023-08-09 VITALS
DIASTOLIC BLOOD PRESSURE: 61 MMHG | TEMPERATURE: 97.9 F | HEART RATE: 88 BPM | BODY MASS INDEX: 54.32 KG/M2 | WEIGHT: 293 LBS | SYSTOLIC BLOOD PRESSURE: 125 MMHG | OXYGEN SATURATION: 98 %

## 2023-08-09 DIAGNOSIS — Z34.90 ENCOUNTER FOR SUPERVISION OF NORMAL PREGNANCY, UNSPECIFIED, UNSPECIFIED TRIMESTER: ICD-10-CM

## 2023-08-09 DIAGNOSIS — O09.90 SUPERVISION OF HIGH RISK PREGNANCY, UNSPECIFIED, UNSPECIFIED TRIMESTER: ICD-10-CM

## 2023-08-09 LAB
BILIRUB UR QL STRIP: NEGATIVE
CLARITY UR: CLEAR
COLLECTION METHOD: NORMAL
FETAL HEART RATE (BPM): 149
FETAL MOVEMENT: PRESENT
GLUCOSE UR-MCNC: NEGATIVE
HCG UR QL: 0.2 EU/DL
HGB UR QL STRIP.AUTO: NEGATIVE
KETONES UR-MCNC: NEGATIVE
LEUKOCYTE ESTERASE UR QL STRIP: NEGATIVE
NITRITE UR QL STRIP: NEGATIVE
OB COMMENTS: NORMAL
PH UR STRIP: 6.5
PROT UR STRIP-MCNC: NEGATIVE
SCHEDULED VISIT: YES
SP GR UR STRIP: 1.01
URINE ALBUMIN/PROTEIN: NEGATIVE
URINE GLUCOSE: NEGATIVE
URINE KETONES: NEGATIVE
WEEKS GESTATION: 28.2

## 2023-08-09 PROCEDURE — 76816 OB US FOLLOW-UP PER FETUS: CPT | Mod: 26

## 2023-08-09 PROCEDURE — 76819 FETAL BIOPHYS PROFIL W/O NST: CPT

## 2023-08-09 PROCEDURE — 76817 TRANSVAGINAL US OBSTETRIC: CPT

## 2023-08-09 PROCEDURE — 81002 URINALYSIS NONAUTO W/O SCOPE: CPT

## 2023-08-09 PROCEDURE — 76819 FETAL BIOPHYS PROFIL W/O NST: CPT | Mod: 26,59

## 2023-08-09 PROCEDURE — 99213 OFFICE O/P EST LOW 20 MIN: CPT

## 2023-08-09 PROCEDURE — 76817 TRANSVAGINAL US OBSTETRIC: CPT | Mod: 26

## 2023-08-09 PROCEDURE — 76816 OB US FOLLOW-UP PER FETUS: CPT

## 2023-08-09 PROCEDURE — 99213 OFFICE O/P EST LOW 20 MIN: CPT | Mod: 25

## 2023-08-09 NOTE — DISCUSSION/SUMMARY
[FreeTextEntry1] : Patient is a 27 y/o  at 28w0d GA BECCA 23 by LMP c/w first trimester sonogram presenting for follow up of asthma, h/o gastric sleeve, obesity, h/o previous  section and evaluation of new problem - pelvic pain. Currently managed by Dr. Davis. Patient is doing well. She denies contractions, leakage of fluid, vaginal bleeding. Endorses good fetal movement.  States her asthma is very mild, never hospitalized or intubated, last used her albuterol months ago. Pt is s/p gastric sleeve in  with 120 lb weight loss. Patient was gaining weight back at the time of conception.  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 Denies, smoking, alcohol or drug use Psych: denies Genetics: denies h/o genetic abnormalities  Physical Exam: BP: 125/61 , HR: 88 bpm, O2sat: 98 %, Wt: 297 lbs, Temp: 97.9 F,  Udip: neg GA: AOx3, NAD  Labs:    at 23 weeks 3 days  AFP screen negative 1.27 MoM (done at [facility], results on phone) 4/15 rubella immune, HepBsAg NR, RPR NR, HCV NR, HIV NR, NIPT low risk male  OBUS : 28w0d vertex, posterior placenta, EFW 1256gm (50%ile), MVP 6.9  22w0d breech, posterior placenta, EFW 507gm (68%ile), MCA and UAD wnl  normal fetal echo 6/15 20w1d EFW 327gm (35%ile), normal anatomy, limited views due to maternal body habitus (cardiac views)  17w0d EFW 170gm (31%ile),   13w6d SIUP  12w1d NT 1.8mm 3/15 7w0d: CRL 7w SIUP  Maternal studies: 7/10 EKG NSR  A/P Patient is a 27yo  at 28w0d GA with asthma, h/o gastric sleeve, obesity, h/o previous  section. Referred back by Dr. Davis due to lower abdominal pain.  #Obesity - Excessive Weight Gain in Pregnancy, third trimester 278 -> 297. Dietary counseling regarding limiting the intake of starch (white bread, white rice, pasta) and sugar (soda, fruit juice) and increasing protein intake (meat, chicken, fish, eggs and nuts).   - The patient is at increased risk for preeclampsia, gestational hypertension, macrosomia, gestational diabetes, deep venous thrombosis,  delivery, stillbirth and certain birth defects, such as open neural tube defects. Also  delivery entails increased risks in obese patients,  - Recommend monthly EFW and weekly biophysical profiles to start at around 34 weeks gestation.  - Patient evaluated by our nutritionist today. - S/p normal GCT, at 23 weeks GA 3 days - per recommendation last visit to be repeated at 28 weeks; repeat ASAP.  #Gastric Sleeve - Advised prenatal vitamins. - Growth sonograms q4 weeks.  #H/o  section.  - Posterior placenta. - Patient does not desire TOLAC.   #Asthma, mild intermittent.  Given the patient's current level of control, recommend to monitor for worsening and have a low threshold for treatment as indicated.   #Pelvic pain - intermittent; not felt today. - Long/Closed cervix today on sonogram - counseled regarding hydration and to report to L&D for > 6 tightenings per hour.  #Pregnancy  - Care per Dr. Davis -  Anesthesia consult needed due to BMI  Recommend follow-up in 4 weeks for EFW and initiation of fetal surveillance at 34 weeks.

## 2023-08-10 ENCOUNTER — APPOINTMENT (OUTPATIENT)
Dept: OBGYN | Facility: CLINIC | Age: 28
End: 2023-08-10
Payer: MEDICAID

## 2023-08-10 VITALS
OXYGEN SATURATION: 98 % | TEMPERATURE: 98 F | WEIGHT: 293 LBS | HEART RATE: 100 BPM | DIASTOLIC BLOOD PRESSURE: 70 MMHG | BODY MASS INDEX: 54.14 KG/M2 | SYSTOLIC BLOOD PRESSURE: 130 MMHG

## 2023-08-10 PROCEDURE — 0502F SUBSEQUENT PRENATAL CARE: CPT

## 2023-08-11 DIAGNOSIS — O99.213 OBESITY COMPLICATING PREGNANCY, THIRD TRIMESTER: ICD-10-CM

## 2023-08-11 DIAGNOSIS — Z3A.28 28 WEEKS GESTATION OF PREGNANCY: ICD-10-CM

## 2023-08-11 DIAGNOSIS — O34.219 MATERNAL CARE FOR UNSPECIFIED TYPE SCAR FROM PREVIOUS CESAREAN DELIVERY: ICD-10-CM

## 2023-08-11 DIAGNOSIS — Z03.75 ENCOUNTER FOR SUSPECTED CERVICAL SHORTENING RULED OUT: ICD-10-CM

## 2023-08-11 DIAGNOSIS — Z3A.08 8 WEEKS GESTATION OF PREGNANCY: ICD-10-CM

## 2023-08-11 DIAGNOSIS — O98.513 OTHER VIRAL DISEASES COMPLICATING PREGNANCY, THIRD TRIMESTER: ICD-10-CM

## 2023-08-11 DIAGNOSIS — O99.843 BARIATRIC SURGERY STATUS COMPLICATING PREGNANCY, THIRD TRIMESTER: ICD-10-CM

## 2023-08-11 DIAGNOSIS — O26.03 EXCESSIVE WEIGHT GAIN IN PREGNANCY, THIRD TRIMESTER: ICD-10-CM

## 2023-08-11 DIAGNOSIS — Z03.74 ENCOUNTER FOR SUSPECTED PROBLEM WITH FETAL GROWTH RULED OUT: ICD-10-CM

## 2023-08-11 DIAGNOSIS — O99.519 DISEASES OF THE RESPIRATORY SYSTEM COMPLICATING PREGNANCY, UNSPECIFIED TRIMESTER: ICD-10-CM

## 2023-08-11 DIAGNOSIS — O26.893 OTHER SPECIFIED PREGNANCY RELATED CONDITIONS, THIRD TRIMESTER: ICD-10-CM

## 2023-08-12 LAB — GLUCOSE 1H P 100 G GLC PO SERPL-MCNC: 150 MG/DL

## 2023-08-17 ENCOUNTER — APPOINTMENT (OUTPATIENT)
Dept: OBGYN | Facility: CLINIC | Age: 28
End: 2023-08-17
Payer: MEDICAID

## 2023-08-17 DIAGNOSIS — R81 GLYCOSURIA: ICD-10-CM

## 2023-08-17 PROCEDURE — 0502F SUBSEQUENT PRENATAL CARE: CPT

## 2023-08-26 LAB
ESTIMATED AVERAGE GLUCOSE: 120 MG/DL
GLUCOSE 2H P MEAL SERPL-MCNC: 94 MG/DL
HBA1C MFR BLD HPLC: 5.8 %

## 2023-08-28 ENCOUNTER — ASOB RESULT (OUTPATIENT)
Age: 28
End: 2023-08-28

## 2023-08-28 ENCOUNTER — APPOINTMENT (OUTPATIENT)
Dept: ANTEPARTUM | Facility: CLINIC | Age: 28
End: 2023-08-28
Payer: MEDICAID

## 2023-08-28 ENCOUNTER — OUTPATIENT (OUTPATIENT)
Dept: OUTPATIENT SERVICES | Facility: HOSPITAL | Age: 28
LOS: 1 days | End: 2023-08-28
Payer: MEDICAID

## 2023-08-28 VITALS
HEART RATE: 92 BPM | DIASTOLIC BLOOD PRESSURE: 66 MMHG | WEIGHT: 293 LBS | SYSTOLIC BLOOD PRESSURE: 123 MMHG | TEMPERATURE: 98.6 F | OXYGEN SATURATION: 97 % | BODY MASS INDEX: 54.87 KG/M2

## 2023-08-28 DIAGNOSIS — R63.8 OTHER SYMPTOMS AND SIGNS CONCERNING FOOD AND FLUID INTAKE: ICD-10-CM

## 2023-08-28 DIAGNOSIS — O09.90 SUPERVISION OF HIGH RISK PREGNANCY, UNSPECIFIED, UNSPECIFIED TRIMESTER: ICD-10-CM

## 2023-08-28 DIAGNOSIS — Z34.90 ENCOUNTER FOR SUPERVISION OF NORMAL PREGNANCY, UNSPECIFIED, UNSPECIFIED TRIMESTER: ICD-10-CM

## 2023-08-28 LAB
BILIRUB UR QL STRIP: NORMAL
BP DIAS: 66 MM HG
BP SYS: 123 MM HG
CLARITY UR: CLEAR
COLLECTION METHOD: NORMAL
FETAL HEART RATE (BPM): 149
FETAL MOVEMENT: PRESENT
GLUCOSE BLDC GLUCOMTR-MCNC: 100
GLUCOSE UR-MCNC: NORMAL
HCG UR QL: 0.2 EU/DL
HGB UR QL STRIP.AUTO: NORMAL
KETONES UR-MCNC: NORMAL
LEUKOCYTE ESTERASE UR QL STRIP: NORMAL
NITRITE UR QL STRIP: NORMAL
OB COMMENTS: NORMAL
PH UR STRIP: 7
PROT UR STRIP-MCNC: NORMAL
SCHEDULED VISIT: YES
SP GR UR STRIP: 1.02
URINE ALBUMIN/PROTEIN: NORMAL
URINE GLUCOSE: NORMAL
URINE KETONES: NORMAL
WEEKS GESTATION: 31

## 2023-08-28 PROCEDURE — 76816 OB US FOLLOW-UP PER FETUS: CPT | Mod: 26

## 2023-08-28 PROCEDURE — 99213 OFFICE O/P EST LOW 20 MIN: CPT | Mod: 25

## 2023-08-28 PROCEDURE — 76819 FETAL BIOPHYS PROFIL W/O NST: CPT | Mod: 26,59

## 2023-08-28 PROCEDURE — 76819 FETAL BIOPHYS PROFIL W/O NST: CPT

## 2023-08-28 PROCEDURE — 82948 REAGENT STRIP/BLOOD GLUCOSE: CPT

## 2023-08-28 PROCEDURE — 81002 URINALYSIS NONAUTO W/O SCOPE: CPT

## 2023-08-28 PROCEDURE — 76816 OB US FOLLOW-UP PER FETUS: CPT

## 2023-08-28 PROCEDURE — 82962 GLUCOSE BLOOD TEST: CPT

## 2023-08-28 NOTE — DISCUSSION/SUMMARY
[FreeTextEntry1] : Patient is a 27 y/o  at 30w5d GA BECCA 23 by LMP c/w first trimester sonogram presenting for follow up of asthma, h/o gastric sleeve, obesity, h/o previous  section. Currently managed by Dr. Davis who desires to deliver her prior to 39 weeks if indicated. Patient is doing well. She denies contractions, leakage of fluid, vaginal bleeding. Endorses good fetal movement.  Greater than expected weight gain this pregnancy, we discussed dietary changes and portioning. GCT was 150, pateitn unable to tolerate GTT. She performed a 2hr postprandial glucose test which was wnl.   States her asthma is very mild, never hospitalized or intubated, last used her albuterol months ago.  Pt is s/p gastric sleeve in  with 120 lb weight loss. Patient was gaining weight at the time of conception.  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 Denies, smoking, alcohol or drug use Psych: denies Genetics: denies h/o genetic abnormalities  Physical Exam: BP: 123/66, HR: 92 bpm, O2sat: 97 %, Wt: 297-->300 lbs, Temp: 98.6 F, Office  (1hr pp) Udip: trace protein GA: AOx3, NAD  Labs:  A1C 5.7%, 2hr pp glucose 94     at 23 weeks 3 days  AFP screen negative 1.27 MoM (done at [facility], results on phone) 4/15 rubella immune, HBsAg NR, RPR NR, HCV NR, HIV NR, NIPT low risk male  OBUS : 30w5d vertex, posterior placenta, EFW 1749gm (60%ile) : 28w0d vertex, posterior placenta, EFW 1256gm (50%ile), MVP 6.9  22w0d breech, posterior placenta, EFW 507gm (68%ile), MCA and UAD wnl  normal fetal echo 6/15 20w1d EFW 327gm (35%ile), normal anatomy, limited views due to maternal body habitus (cardiac views)  17w0d EFW 170gm (31%ile),  13w6d SIUP  12w1d NT 1.8mm 3/15 7w0d: CRL 7w SIUP  Maternal studies: 7/10 EKG NSR  A/P Patient is a 29yo  at 30w5d GA with asthma, h/o gastric sleeve, obesity, h/o previous  section. Referred back by Dr. Davis to decide on timing of delivery.  #Obesity - Excessive Weight Gain in Pregnancy, third trimester 278 -> 300. Dietary counseling regarding limiting the intake of starch (white bread, white rice, pasta) and sugar (soda, fruit juice) and increasing protein intake (meat, chicken, fish, eggs and nuts). Also discussed portion control. - The patient is at increased risk for preeclampsia, gestational hypertension, macrosomia, gestational diabetes, deep venous thrombosis,  delivery, stillbirth and certain birth defects, such as open neural tube defects. Also  delivery entails increased risks in obese patients, - Recommend monthly EFW and weekly biophysical profiles to start at around 34 weeks gestation. - s/p evaluation by our nutritionist - , 2hr pp glucose wnl, and office fingerstick today also wnl.  #Gastric Sleeve - Advised prenatal vitamins. - Growth sonograms q4 weeks.  #H/o  section. - Posterior placenta. - Patient does not desire TOLAC.  #Asthma, mild intermittent. Given the patient's current level of control, recommend to monitor for worsening and have a low threshold for treatment as indicated.  #Borderline Blood Pressures - 130s/80s in office, advised patient to begin monitoring blood pressures at home - Present to L&D if BPs >140/90   #Pregnancy - Care per Dr. Davis - Anesthesia consult needed due to BMI  RTC 3 weeks for visit and to initiate weekly BPPs

## 2023-08-29 ENCOUNTER — NON-APPOINTMENT (OUTPATIENT)
Age: 28
End: 2023-08-29

## 2023-08-30 DIAGNOSIS — O99.843 BARIATRIC SURGERY STATUS COMPLICATING PREGNANCY, THIRD TRIMESTER: ICD-10-CM

## 2023-08-30 DIAGNOSIS — Z03.74 ENCOUNTER FOR SUSPECTED PROBLEM WITH FETAL GROWTH RULED OUT: ICD-10-CM

## 2023-08-30 DIAGNOSIS — O99.519 DISEASES OF THE RESPIRATORY SYSTEM COMPLICATING PREGNANCY, UNSPECIFIED TRIMESTER: ICD-10-CM

## 2023-08-30 DIAGNOSIS — O99.810 ABNORMAL GLUCOSE COMPLICATING PREGNANCY: ICD-10-CM

## 2023-08-30 DIAGNOSIS — O34.219 MATERNAL CARE FOR UNSPECIFIED TYPE SCAR FROM PREVIOUS CESAREAN DELIVERY: ICD-10-CM

## 2023-08-30 DIAGNOSIS — O98.513 OTHER VIRAL DISEASES COMPLICATING PREGNANCY, THIRD TRIMESTER: ICD-10-CM

## 2023-08-30 DIAGNOSIS — O26.03 EXCESSIVE WEIGHT GAIN IN PREGNANCY, THIRD TRIMESTER: ICD-10-CM

## 2023-08-30 DIAGNOSIS — O99.213 OBESITY COMPLICATING PREGNANCY, THIRD TRIMESTER: ICD-10-CM

## 2023-08-30 DIAGNOSIS — Z3A.30 30 WEEKS GESTATION OF PREGNANCY: ICD-10-CM

## 2023-08-31 ENCOUNTER — APPOINTMENT (OUTPATIENT)
Dept: OBGYN | Facility: CLINIC | Age: 28
End: 2023-08-31
Payer: MEDICAID

## 2023-08-31 PROCEDURE — 0502F SUBSEQUENT PRENATAL CARE: CPT

## 2023-09-05 ENCOUNTER — APPOINTMENT (OUTPATIENT)
Dept: ANTEPARTUM | Facility: CLINIC | Age: 28
End: 2023-09-05

## 2023-09-11 ENCOUNTER — APPOINTMENT (OUTPATIENT)
Dept: ANTEPARTUM | Facility: CLINIC | Age: 28
End: 2023-09-11

## 2023-09-14 ENCOUNTER — NON-APPOINTMENT (OUTPATIENT)
Age: 28
End: 2023-09-14

## 2023-09-14 ENCOUNTER — APPOINTMENT (OUTPATIENT)
Dept: OBGYN | Facility: CLINIC | Age: 28
End: 2023-09-14
Payer: MEDICAID

## 2023-09-14 VITALS
OXYGEN SATURATION: 97 % | TEMPERATURE: 97.8 F | DIASTOLIC BLOOD PRESSURE: 70 MMHG | WEIGHT: 293 LBS | BODY MASS INDEX: 55.05 KG/M2 | HEART RATE: 104 BPM | SYSTOLIC BLOOD PRESSURE: 126 MMHG

## 2023-09-14 PROCEDURE — 0502F SUBSEQUENT PRENATAL CARE: CPT

## 2023-09-18 ENCOUNTER — OUTPATIENT (OUTPATIENT)
Dept: OUTPATIENT SERVICES | Facility: HOSPITAL | Age: 28
LOS: 1 days | End: 2023-09-18
Payer: MEDICAID

## 2023-09-18 ENCOUNTER — ASOB RESULT (OUTPATIENT)
Age: 28
End: 2023-09-18

## 2023-09-18 ENCOUNTER — APPOINTMENT (OUTPATIENT)
Dept: ANTEPARTUM | Facility: CLINIC | Age: 28
End: 2023-09-18
Payer: MEDICAID

## 2023-09-18 ENCOUNTER — APPOINTMENT (OUTPATIENT)
Dept: CARDIOLOGY | Facility: CLINIC | Age: 28
End: 2023-09-18

## 2023-09-18 DIAGNOSIS — O09.93 SUPERVISION OF HIGH RISK PREGNANCY, UNSPECIFIED, THIRD TRIMESTER: ICD-10-CM

## 2023-09-18 PROCEDURE — 76818 FETAL BIOPHYS PROFILE W/NST: CPT

## 2023-09-18 PROCEDURE — 76818 FETAL BIOPHYS PROFILE W/NST: CPT | Mod: 26

## 2023-09-19 DIAGNOSIS — O34.219 MATERNAL CARE FOR UNSPECIFIED TYPE SCAR FROM PREVIOUS CESAREAN DELIVERY: ICD-10-CM

## 2023-09-19 DIAGNOSIS — O99.843 BARIATRIC SURGERY STATUS COMPLICATING PREGNANCY, THIRD TRIMESTER: ICD-10-CM

## 2023-09-19 DIAGNOSIS — O99.213 OBESITY COMPLICATING PREGNANCY, THIRD TRIMESTER: ICD-10-CM

## 2023-09-19 DIAGNOSIS — Z3A.33 33 WEEKS GESTATION OF PREGNANCY: ICD-10-CM

## 2023-09-19 DIAGNOSIS — O26.03 EXCESSIVE WEIGHT GAIN IN PREGNANCY, THIRD TRIMESTER: ICD-10-CM

## 2023-09-19 DIAGNOSIS — O99.810 ABNORMAL GLUCOSE COMPLICATING PREGNANCY: ICD-10-CM

## 2023-09-19 DIAGNOSIS — Z03.74 ENCOUNTER FOR SUSPECTED PROBLEM WITH FETAL GROWTH RULED OUT: ICD-10-CM

## 2023-09-19 DIAGNOSIS — O98.513 OTHER VIRAL DISEASES COMPLICATING PREGNANCY, THIRD TRIMESTER: ICD-10-CM

## 2023-09-22 ENCOUNTER — APPOINTMENT (OUTPATIENT)
Dept: OBGYN | Facility: CLINIC | Age: 28
End: 2023-09-22
Payer: MEDICAID

## 2023-09-22 ENCOUNTER — NON-APPOINTMENT (OUTPATIENT)
Age: 28
End: 2023-09-22

## 2023-09-22 VITALS
BODY MASS INDEX: 54.69 KG/M2 | WEIGHT: 293 LBS | SYSTOLIC BLOOD PRESSURE: 126 MMHG | DIASTOLIC BLOOD PRESSURE: 66 MMHG | OXYGEN SATURATION: 98 % | HEART RATE: 95 BPM | TEMPERATURE: 97.9 F

## 2023-09-22 PROCEDURE — 0502F SUBSEQUENT PRENATAL CARE: CPT

## 2023-09-25 ENCOUNTER — OUTPATIENT (OUTPATIENT)
Dept: OUTPATIENT SERVICES | Facility: HOSPITAL | Age: 28
LOS: 1 days | End: 2023-09-25
Payer: MEDICAID

## 2023-09-25 ENCOUNTER — ASOB RESULT (OUTPATIENT)
Age: 28
End: 2023-09-25

## 2023-09-25 ENCOUNTER — APPOINTMENT (OUTPATIENT)
Dept: ANTEPARTUM | Facility: CLINIC | Age: 28
End: 2023-09-25
Payer: MEDICAID

## 2023-09-25 DIAGNOSIS — J45.909 UNSPECIFIED ASTHMA, UNCOMPLICATED: ICD-10-CM

## 2023-09-25 DIAGNOSIS — Z3A.34 34 WEEKS GESTATION OF PREGNANCY: ICD-10-CM

## 2023-09-25 DIAGNOSIS — O09.93 SUPERVISION OF HIGH RISK PREGNANCY, UNSPECIFIED, THIRD TRIMESTER: ICD-10-CM

## 2023-09-25 DIAGNOSIS — O99.210 OBESITY COMPLICATING PREGNANCY, UNSPECIFIED TRIMESTER: ICD-10-CM

## 2023-09-25 PROCEDURE — 76816 OB US FOLLOW-UP PER FETUS: CPT | Mod: 26

## 2023-09-25 PROCEDURE — 99211 OFF/OP EST MAY X REQ PHY/QHP: CPT

## 2023-09-25 PROCEDURE — 76816 OB US FOLLOW-UP PER FETUS: CPT

## 2023-09-25 PROCEDURE — 99211 OFF/OP EST MAY X REQ PHY/QHP: CPT | Mod: 25

## 2023-09-25 PROCEDURE — 76818 FETAL BIOPHYS PROFILE W/NST: CPT

## 2023-09-25 PROCEDURE — 76818 FETAL BIOPHYS PROFILE W/NST: CPT | Mod: 26

## 2023-09-28 DIAGNOSIS — Z03.74 ENCOUNTER FOR SUSPECTED PROBLEM WITH FETAL GROWTH RULED OUT: ICD-10-CM

## 2023-09-28 DIAGNOSIS — J45.909 UNSPECIFIED ASTHMA, UNCOMPLICATED: ICD-10-CM

## 2023-09-28 DIAGNOSIS — Z98.891 HISTORY OF UTERINE SCAR FROM PREVIOUS SURGERY: ICD-10-CM

## 2023-09-28 DIAGNOSIS — O99.213 OBESITY COMPLICATING PREGNANCY, THIRD TRIMESTER: ICD-10-CM

## 2023-09-28 DIAGNOSIS — O99.810 ABNORMAL GLUCOSE COMPLICATING PREGNANCY: ICD-10-CM

## 2023-09-28 DIAGNOSIS — O26.03 EXCESSIVE WEIGHT GAIN IN PREGNANCY, THIRD TRIMESTER: ICD-10-CM

## 2023-09-28 DIAGNOSIS — O99.843 BARIATRIC SURGERY STATUS COMPLICATING PREGNANCY, THIRD TRIMESTER: ICD-10-CM

## 2023-09-28 DIAGNOSIS — Z3A.34 34 WEEKS GESTATION OF PREGNANCY: ICD-10-CM

## 2023-09-28 DIAGNOSIS — O34.219 MATERNAL CARE FOR UNSPECIFIED TYPE SCAR FROM PREVIOUS CESAREAN DELIVERY: ICD-10-CM

## 2023-09-28 DIAGNOSIS — O99.210 OBESITY COMPLICATING PREGNANCY, UNSPECIFIED TRIMESTER: ICD-10-CM

## 2023-09-28 DIAGNOSIS — O98.513 OTHER VIRAL DISEASES COMPLICATING PREGNANCY, THIRD TRIMESTER: ICD-10-CM

## 2023-09-29 ENCOUNTER — APPOINTMENT (OUTPATIENT)
Dept: OBGYN | Facility: CLINIC | Age: 28
End: 2023-09-29

## 2023-10-02 ENCOUNTER — ASOB RESULT (OUTPATIENT)
Age: 28
End: 2023-10-02

## 2023-10-02 ENCOUNTER — APPOINTMENT (OUTPATIENT)
Dept: ANTEPARTUM | Facility: CLINIC | Age: 28
End: 2023-10-02
Payer: MEDICAID

## 2023-10-02 ENCOUNTER — OUTPATIENT (OUTPATIENT)
Dept: OUTPATIENT SERVICES | Facility: HOSPITAL | Age: 28
LOS: 1 days | End: 2023-10-02
Payer: MEDICAID

## 2023-10-02 VITALS — DIASTOLIC BLOOD PRESSURE: 60 MMHG | SYSTOLIC BLOOD PRESSURE: 116 MMHG | HEART RATE: 95 BPM

## 2023-10-02 DIAGNOSIS — Z34.90 ENCOUNTER FOR SUPERVISION OF NORMAL PREGNANCY, UNSPECIFIED, UNSPECIFIED TRIMESTER: ICD-10-CM

## 2023-10-02 DIAGNOSIS — O09.93 SUPERVISION OF HIGH RISK PREGNANCY, UNSPECIFIED, THIRD TRIMESTER: ICD-10-CM

## 2023-10-02 PROCEDURE — 76818 FETAL BIOPHYS PROFILE W/NST: CPT

## 2023-10-02 PROCEDURE — 76818 FETAL BIOPHYS PROFILE W/NST: CPT | Mod: 26

## 2023-10-03 ENCOUNTER — NON-APPOINTMENT (OUTPATIENT)
Age: 28
End: 2023-10-03

## 2023-10-04 DIAGNOSIS — O26.03 EXCESSIVE WEIGHT GAIN IN PREGNANCY, THIRD TRIMESTER: ICD-10-CM

## 2023-10-04 DIAGNOSIS — O99.843 BARIATRIC SURGERY STATUS COMPLICATING PREGNANCY, THIRD TRIMESTER: ICD-10-CM

## 2023-10-04 DIAGNOSIS — Z3A.35 35 WEEKS GESTATION OF PREGNANCY: ICD-10-CM

## 2023-10-04 DIAGNOSIS — O34.219 MATERNAL CARE FOR UNSPECIFIED TYPE SCAR FROM PREVIOUS CESAREAN DELIVERY: ICD-10-CM

## 2023-10-04 DIAGNOSIS — O99.810 ABNORMAL GLUCOSE COMPLICATING PREGNANCY: ICD-10-CM

## 2023-10-04 DIAGNOSIS — O99.213 OBESITY COMPLICATING PREGNANCY, THIRD TRIMESTER: ICD-10-CM

## 2023-10-04 DIAGNOSIS — O98.513 OTHER VIRAL DISEASES COMPLICATING PREGNANCY, THIRD TRIMESTER: ICD-10-CM

## 2023-10-05 ENCOUNTER — APPOINTMENT (OUTPATIENT)
Dept: OBGYN | Facility: CLINIC | Age: 28
End: 2023-10-05
Payer: MEDICAID

## 2023-10-05 VITALS — HEIGHT: 62 IN | TEMPERATURE: 97.8 F | BODY MASS INDEX: 18 KG/M2 | WEIGHT: 97.8 LBS

## 2023-10-05 VITALS
BODY MASS INDEX: 55.24 KG/M2 | SYSTOLIC BLOOD PRESSURE: 120 MMHG | DIASTOLIC BLOOD PRESSURE: 80 MMHG | TEMPERATURE: 97.8 F | OXYGEN SATURATION: 98 % | HEART RATE: 111 BPM | WEIGHT: 293 LBS

## 2023-10-05 DIAGNOSIS — Z11.3 ENCOUNTER FOR SCREENING FOR INFECTIONS WITH A PREDOMINANTLY SEXUAL MODE OF TRANSMISSION: ICD-10-CM

## 2023-10-05 DIAGNOSIS — Z36.85 ENCOUNTER FOR ANTENATAL SCREENING FOR STREPTOCOCCUS B: ICD-10-CM

## 2023-10-05 PROCEDURE — 0502F SUBSEQUENT PRENATAL CARE: CPT

## 2023-10-06 LAB
C TRACH RRNA SPEC QL NAA+PROBE: NOT DETECTED
N GONORRHOEA RRNA SPEC QL NAA+PROBE: NOT DETECTED
SOURCE AMPLIFICATION: NORMAL
SOURCE AMPLIFICATION: NORMAL
T VAGINALIS RRNA SPEC QL NAA+PROBE: NOT DETECTED

## 2023-10-07 LAB — B-HEM STREP SPEC QL CULT: NORMAL

## 2023-10-11 ENCOUNTER — APPOINTMENT (OUTPATIENT)
Dept: ANTEPARTUM | Facility: CLINIC | Age: 28
End: 2023-10-11

## 2023-10-16 ENCOUNTER — NON-APPOINTMENT (OUTPATIENT)
Age: 28
End: 2023-10-16

## 2023-10-16 ENCOUNTER — APPOINTMENT (OUTPATIENT)
Dept: ANTEPARTUM | Facility: CLINIC | Age: 28
End: 2023-10-16

## 2023-10-16 ENCOUNTER — APPOINTMENT (OUTPATIENT)
Dept: OBGYN | Facility: CLINIC | Age: 28
End: 2023-10-16
Payer: MEDICAID

## 2023-10-16 VITALS
BODY MASS INDEX: 53.92 KG/M2 | WEIGHT: 293 LBS | SYSTOLIC BLOOD PRESSURE: 124 MMHG | HEIGHT: 62 IN | OXYGEN SATURATION: 98 % | TEMPERATURE: 97.9 F | HEART RATE: 97 BPM | DIASTOLIC BLOOD PRESSURE: 78 MMHG

## 2023-10-16 PROCEDURE — 0502F SUBSEQUENT PRENATAL CARE: CPT

## 2023-10-23 ENCOUNTER — APPOINTMENT (OUTPATIENT)
Dept: ANTEPARTUM | Facility: CLINIC | Age: 28
End: 2023-10-23

## 2023-10-23 ENCOUNTER — APPOINTMENT (OUTPATIENT)
Dept: OBGYN | Facility: CLINIC | Age: 28
End: 2023-10-23
Payer: MEDICAID

## 2023-10-23 VITALS
HEART RATE: 113 BPM | SYSTOLIC BLOOD PRESSURE: 118 MMHG | DIASTOLIC BLOOD PRESSURE: 60 MMHG | WEIGHT: 293 LBS | BODY MASS INDEX: 55.24 KG/M2 | TEMPERATURE: 98 F

## 2023-10-23 PROCEDURE — 0502F SUBSEQUENT PRENATAL CARE: CPT

## 2023-10-25 ENCOUNTER — RESULT REVIEW (OUTPATIENT)
Age: 28
End: 2023-10-25

## 2023-10-25 ENCOUNTER — TRANSCRIPTION ENCOUNTER (OUTPATIENT)
Age: 28
End: 2023-10-25

## 2023-10-25 ENCOUNTER — INPATIENT (INPATIENT)
Facility: HOSPITAL | Age: 28
LOS: 1 days | Discharge: HOME | DRG: 540 | End: 2023-10-27
Attending: OBSTETRICS & GYNECOLOGY | Admitting: OBSTETRICS & GYNECOLOGY
Payer: MEDICAID

## 2023-10-25 DIAGNOSIS — Z90.3 ACQUIRED ABSENCE OF STOMACH [PART OF]: Chronic | ICD-10-CM

## 2023-10-25 LAB
BASOPHILS # BLD AUTO: 0.02 K/UL — SIGNIFICANT CHANGE UP (ref 0–0.2)
BASOPHILS # BLD AUTO: 0.02 K/UL — SIGNIFICANT CHANGE UP (ref 0–0.2)
BASOPHILS NFR BLD AUTO: 0.3 % — SIGNIFICANT CHANGE UP (ref 0–1)
BASOPHILS NFR BLD AUTO: 0.3 % — SIGNIFICANT CHANGE UP (ref 0–1)
BLD GP AB SCN SERPL QL: SIGNIFICANT CHANGE UP
BLD GP AB SCN SERPL QL: SIGNIFICANT CHANGE UP
EOSINOPHIL # BLD AUTO: 0.14 K/UL — SIGNIFICANT CHANGE UP (ref 0–0.7)
EOSINOPHIL # BLD AUTO: 0.14 K/UL — SIGNIFICANT CHANGE UP (ref 0–0.7)
EOSINOPHIL NFR BLD AUTO: 1.8 % — SIGNIFICANT CHANGE UP (ref 0–8)
EOSINOPHIL NFR BLD AUTO: 1.8 % — SIGNIFICANT CHANGE UP (ref 0–8)
GLUCOSE BLDC GLUCOMTR-MCNC: 82 MG/DL — SIGNIFICANT CHANGE UP (ref 70–99)
GLUCOSE BLDC GLUCOMTR-MCNC: 82 MG/DL — SIGNIFICANT CHANGE UP (ref 70–99)
HCT VFR BLD CALC: 38 % — SIGNIFICANT CHANGE UP (ref 37–47)
HCT VFR BLD CALC: 38 % — SIGNIFICANT CHANGE UP (ref 37–47)
HGB BLD-MCNC: 12.2 G/DL — SIGNIFICANT CHANGE UP (ref 12–16)
HGB BLD-MCNC: 12.2 G/DL — SIGNIFICANT CHANGE UP (ref 12–16)
HIV 1 & 2 AB SERPL IA.RAPID: SIGNIFICANT CHANGE UP
HIV 1 & 2 AB SERPL IA.RAPID: SIGNIFICANT CHANGE UP
IMM GRANULOCYTES NFR BLD AUTO: 0.8 % — HIGH (ref 0.1–0.3)
IMM GRANULOCYTES NFR BLD AUTO: 0.8 % — HIGH (ref 0.1–0.3)
LYMPHOCYTES # BLD AUTO: 1.88 K/UL — SIGNIFICANT CHANGE UP (ref 1.2–3.4)
LYMPHOCYTES # BLD AUTO: 1.88 K/UL — SIGNIFICANT CHANGE UP (ref 1.2–3.4)
LYMPHOCYTES # BLD AUTO: 23.9 % — SIGNIFICANT CHANGE UP (ref 20.5–51.1)
LYMPHOCYTES # BLD AUTO: 23.9 % — SIGNIFICANT CHANGE UP (ref 20.5–51.1)
MCHC RBC-ENTMCNC: 26.6 PG — LOW (ref 27–31)
MCHC RBC-ENTMCNC: 26.6 PG — LOW (ref 27–31)
MCHC RBC-ENTMCNC: 32.1 G/DL — SIGNIFICANT CHANGE UP (ref 32–37)
MCHC RBC-ENTMCNC: 32.1 G/DL — SIGNIFICANT CHANGE UP (ref 32–37)
MCV RBC AUTO: 82.8 FL — SIGNIFICANT CHANGE UP (ref 81–99)
MCV RBC AUTO: 82.8 FL — SIGNIFICANT CHANGE UP (ref 81–99)
MONOCYTES # BLD AUTO: 0.55 K/UL — SIGNIFICANT CHANGE UP (ref 0.1–0.6)
MONOCYTES # BLD AUTO: 0.55 K/UL — SIGNIFICANT CHANGE UP (ref 0.1–0.6)
MONOCYTES NFR BLD AUTO: 7 % — SIGNIFICANT CHANGE UP (ref 1.7–9.3)
MONOCYTES NFR BLD AUTO: 7 % — SIGNIFICANT CHANGE UP (ref 1.7–9.3)
NEUTROPHILS # BLD AUTO: 5.23 K/UL — SIGNIFICANT CHANGE UP (ref 1.4–6.5)
NEUTROPHILS # BLD AUTO: 5.23 K/UL — SIGNIFICANT CHANGE UP (ref 1.4–6.5)
NEUTROPHILS NFR BLD AUTO: 66.2 % — SIGNIFICANT CHANGE UP (ref 42.2–75.2)
NEUTROPHILS NFR BLD AUTO: 66.2 % — SIGNIFICANT CHANGE UP (ref 42.2–75.2)
NRBC # BLD: 0 /100 WBCS — SIGNIFICANT CHANGE UP (ref 0–0)
NRBC # BLD: 0 /100 WBCS — SIGNIFICANT CHANGE UP (ref 0–0)
PLATELET # BLD AUTO: 267 K/UL — SIGNIFICANT CHANGE UP (ref 130–400)
PLATELET # BLD AUTO: 267 K/UL — SIGNIFICANT CHANGE UP (ref 130–400)
PMV BLD: 10.8 FL — HIGH (ref 7.4–10.4)
PMV BLD: 10.8 FL — HIGH (ref 7.4–10.4)
PRENATAL SYPHILIS TEST: SIGNIFICANT CHANGE UP
PRENATAL SYPHILIS TEST: SIGNIFICANT CHANGE UP
RBC # BLD: 4.59 M/UL — SIGNIFICANT CHANGE UP (ref 4.2–5.4)
RBC # BLD: 4.59 M/UL — SIGNIFICANT CHANGE UP (ref 4.2–5.4)
RBC # FLD: 13.5 % — SIGNIFICANT CHANGE UP (ref 11.5–14.5)
RBC # FLD: 13.5 % — SIGNIFICANT CHANGE UP (ref 11.5–14.5)
WBC # BLD: 7.88 K/UL — SIGNIFICANT CHANGE UP (ref 4.8–10.8)
WBC # BLD: 7.88 K/UL — SIGNIFICANT CHANGE UP (ref 4.8–10.8)
WBC # FLD AUTO: 7.88 K/UL — SIGNIFICANT CHANGE UP (ref 4.8–10.8)
WBC # FLD AUTO: 7.88 K/UL — SIGNIFICANT CHANGE UP (ref 4.8–10.8)

## 2023-10-25 PROCEDURE — 86901 BLOOD TYPING SEROLOGIC RH(D): CPT

## 2023-10-25 PROCEDURE — 88307 TISSUE EXAM BY PATHOLOGIST: CPT | Mod: 26

## 2023-10-25 PROCEDURE — 59025 FETAL NON-STRESS TEST: CPT

## 2023-10-25 PROCEDURE — 82962 GLUCOSE BLOOD TEST: CPT

## 2023-10-25 PROCEDURE — 85025 COMPLETE CBC W/AUTO DIFF WBC: CPT

## 2023-10-25 PROCEDURE — 88307 TISSUE EXAM BY PATHOLOGIST: CPT

## 2023-10-25 PROCEDURE — 86703 HIV-1/HIV-2 1 RESULT ANTBDY: CPT

## 2023-10-25 PROCEDURE — 86592 SYPHILIS TEST NON-TREP QUAL: CPT

## 2023-10-25 PROCEDURE — 59510 CESAREAN DELIVERY: CPT | Mod: U9

## 2023-10-25 PROCEDURE — 86900 BLOOD TYPING SEROLOGIC ABO: CPT

## 2023-10-25 PROCEDURE — 36415 COLL VENOUS BLD VENIPUNCTURE: CPT

## 2023-10-25 PROCEDURE — 86850 RBC ANTIBODY SCREEN: CPT

## 2023-10-25 RX ORDER — OXYTOCIN 10 UNIT/ML
333.33 VIAL (ML) INJECTION
Qty: 20 | Refills: 0 | Status: DISCONTINUED | OUTPATIENT
Start: 2023-10-25 | End: 2023-10-25

## 2023-10-25 RX ORDER — LANOLIN
1 OINTMENT (GRAM) TOPICAL EVERY 6 HOURS
Refills: 0 | Status: DISCONTINUED | OUTPATIENT
Start: 2023-10-25 | End: 2023-10-27

## 2023-10-25 RX ORDER — CEFAZOLIN SODIUM 1 G
2000 VIAL (EA) INJECTION ONCE
Refills: 0 | Status: DISCONTINUED | OUTPATIENT
Start: 2023-10-25 | End: 2023-10-25

## 2023-10-25 RX ORDER — OXYCODONE HYDROCHLORIDE 5 MG/1
5 TABLET ORAL ONCE
Refills: 0 | Status: DISCONTINUED | OUTPATIENT
Start: 2023-10-25 | End: 2023-10-27

## 2023-10-25 RX ORDER — MORPHINE SULFATE 50 MG/1
0.15 CAPSULE, EXTENDED RELEASE ORAL ONCE
Refills: 0 | Status: DISCONTINUED | OUTPATIENT
Start: 2023-10-25 | End: 2023-10-27

## 2023-10-25 RX ORDER — SODIUM CHLORIDE 9 MG/ML
1000 INJECTION, SOLUTION INTRAVENOUS ONCE
Refills: 0 | Status: DISCONTINUED | OUTPATIENT
Start: 2023-10-25 | End: 2023-10-25

## 2023-10-25 RX ORDER — OXYCODONE HYDROCHLORIDE 5 MG/1
5 TABLET ORAL
Refills: 0 | Status: COMPLETED | OUTPATIENT
Start: 2023-10-25 | End: 2023-11-01

## 2023-10-25 RX ORDER — TETANUS TOXOID, REDUCED DIPHTHERIA TOXOID AND ACELLULAR PERTUSSIS VACCINE, ADSORBED 5; 2.5; 8; 8; 2.5 [IU]/.5ML; [IU]/.5ML; UG/.5ML; UG/.5ML; UG/.5ML
0.5 SUSPENSION INTRAMUSCULAR ONCE
Refills: 0 | Status: DISCONTINUED | OUTPATIENT
Start: 2023-10-25 | End: 2023-10-27

## 2023-10-25 RX ORDER — ENOXAPARIN SODIUM 100 MG/ML
40 INJECTION SUBCUTANEOUS EVERY 12 HOURS
Refills: 0 | Status: DISCONTINUED | OUTPATIENT
Start: 2023-10-26 | End: 2023-10-27

## 2023-10-25 RX ORDER — IBUPROFEN 200 MG
600 TABLET ORAL EVERY 6 HOURS
Refills: 0 | Status: DISCONTINUED | OUTPATIENT
Start: 2023-10-25 | End: 2023-10-25

## 2023-10-25 RX ORDER — SIMETHICONE 80 MG/1
80 TABLET, CHEWABLE ORAL EVERY 4 HOURS
Refills: 0 | Status: DISCONTINUED | OUTPATIENT
Start: 2023-10-25 | End: 2023-10-27

## 2023-10-25 RX ORDER — ACETAMINOPHEN 500 MG
975 TABLET ORAL
Refills: 0 | Status: DISCONTINUED | OUTPATIENT
Start: 2023-10-25 | End: 2023-10-27

## 2023-10-25 RX ORDER — NALOXONE HYDROCHLORIDE 4 MG/.1ML
0.1 SPRAY NASAL
Refills: 0 | Status: DISCONTINUED | OUTPATIENT
Start: 2023-10-25 | End: 2023-10-27

## 2023-10-25 RX ORDER — MAGNESIUM HYDROXIDE 400 MG/1
30 TABLET, CHEWABLE ORAL
Refills: 0 | Status: DISCONTINUED | OUTPATIENT
Start: 2023-10-25 | End: 2023-10-27

## 2023-10-25 RX ORDER — CEFAZOLIN SODIUM 1 G
3000 VIAL (EA) INJECTION ONCE
Refills: 0 | Status: COMPLETED | OUTPATIENT
Start: 2023-10-25 | End: 2023-10-25

## 2023-10-25 RX ORDER — DEXAMETHASONE 0.5 MG/5ML
4 ELIXIR ORAL EVERY 6 HOURS
Refills: 0 | Status: DISCONTINUED | OUTPATIENT
Start: 2023-10-25 | End: 2023-10-27

## 2023-10-25 RX ORDER — ONDANSETRON 8 MG/1
4 TABLET, FILM COATED ORAL EVERY 6 HOURS
Refills: 0 | Status: DISCONTINUED | OUTPATIENT
Start: 2023-10-25 | End: 2023-10-27

## 2023-10-25 RX ORDER — KETOROLAC TROMETHAMINE 30 MG/ML
30 SYRINGE (ML) INJECTION EVERY 6 HOURS
Refills: 0 | Status: DISCONTINUED | OUTPATIENT
Start: 2023-10-25 | End: 2023-10-26

## 2023-10-25 RX ORDER — SODIUM CHLORIDE 9 MG/ML
1000 INJECTION, SOLUTION INTRAVENOUS
Refills: 0 | Status: DISCONTINUED | OUTPATIENT
Start: 2023-10-25 | End: 2023-10-25

## 2023-10-25 RX ORDER — DIPHENHYDRAMINE HCL 50 MG
25 CAPSULE ORAL EVERY 6 HOURS
Refills: 0 | Status: COMPLETED | OUTPATIENT
Start: 2023-10-25 | End: 2024-09-22

## 2023-10-25 RX ORDER — SODIUM CHLORIDE 9 MG/ML
1000 INJECTION, SOLUTION INTRAVENOUS
Refills: 0 | Status: DISCONTINUED | OUTPATIENT
Start: 2023-10-25 | End: 2023-10-27

## 2023-10-25 RX ORDER — OXYTOCIN 10 UNIT/ML
333.33 VIAL (ML) INJECTION
Qty: 20 | Refills: 0 | Status: DISCONTINUED | OUTPATIENT
Start: 2023-10-25 | End: 2023-10-27

## 2023-10-25 RX ADMIN — Medication 30 MILLIGRAM(S): at 18:55

## 2023-10-25 RX ADMIN — SODIUM CHLORIDE 125 MILLILITER(S): 9 INJECTION, SOLUTION INTRAVENOUS at 19:50

## 2023-10-25 RX ADMIN — Medication 30 MILLIGRAM(S): at 23:46

## 2023-10-25 RX ADMIN — Medication 975 MILLIGRAM(S): at 21:00

## 2023-10-25 RX ADMIN — Medication 975 MILLIGRAM(S): at 20:30

## 2023-10-25 RX ADMIN — Medication 200 MILLIGRAM(S): at 10:01

## 2023-10-25 NOTE — OB RN INTRAOPERATIVE NOTE - NSSELHIDDEN_OBGYN_ALL_OB_FT
[[email or web address]_OBGYN_ALL_OB_FT:[id number]],[NS_DeliveryRN_OBGYN_ALL_OB_FT:[id number]]

## 2023-10-25 NOTE — CHART NOTE - NSCHARTNOTEFT_GEN_A_CORE
PACU ANESTHESIA ADMISSION NOTE      Procedure:   Post op diagnosis:      ____  Intubated  TV:______       Rate: ______      FiO2: ______    _x___  Patent Airway    _x___  Full return of protective reflexes    _x___  Full recovery from anesthesia / back to baseline status    Vitals:    See anesthesia record      Mental Status:  _x___ Awake   _____ Alert   _____ Drowsy   _____ Sedated    Nausea/Vomiting:  _x___  NO       ______Yes,   See Post - Op Orders         Pain Scale (0-10):  __0___    Treatment: _x___ None    ____ See Post - Op/PCA Orders    Post - Operative Fluids:   __x__ Oral   ____ See Post - Op Orders    Plan: Discharge:   ___Home       _x____Floor     _____Critical Care    _____  Other:_________________    Comments:  No anesthesia issues or complications noted.  Discharge when criteria met.

## 2023-10-25 NOTE — OB RN INTRAOPERATIVE NOTE - NS_ELECTROPADLOC_OBGYN_ALL_OB
How Severe Is Your Skin Lesion?: mild Is This A New Presentation, Or A Follow-Up?: Growth Right thigh

## 2023-10-25 NOTE — OB PROVIDER H&P - NSHPPHYSICALEXAM_GEN_ALL_CORE
T(C): 36.2 (10-25-23 @ 07:28), Max: 36.2 (10-25-23 @ 07:28)  HR: 106 (10-25-23 @ 07:28) (106 - 106)  BP: 119/59 (10-25-23 @ 07:28) (119/59 - 119/59)  RR: 20 (10-25-23 @ 07:28) (20 - 20) T(C): 36.2 (10-25-23 @ 07:28), Max: 36.2 (10-25-23 @ 07:28)  HR: 106 (10-25-23 @ 07:28) (106 - 106)  BP: 119/59 (10-25-23 @ 07:28) (119/59 - 119/59)  RR: 20 (10-25-23 @ 07:28) (20 - 20)    Abd: gravid, soft, NT  VE deferred  Ctx: none noted  FHR: 140 moderate variability, cat I

## 2023-10-25 NOTE — OB PROVIDER H&P - ASSESSMENT
28y.o.  @ 39wks for repeat , Obesity, h/o Asthma, h/o Gastric sleeve  Admit to L&D  IVF, labs  Continuous EFM and toco  SCD's  Rodriguez catheter  Abdominal prep  Ancef prior to OR  On call to OR  Dr. Davis Aware

## 2023-10-25 NOTE — OB PROVIDER DELIVERY SUMMARY - NSSELHIDDEN_OBGYN_ALL_OB_FT
[NS_DeliveryAttending1_OBGYN_ALL_OB_FT:[id number]],[NS_DeliveryRN_OBGYN_ALL_OB_FT:[id number]],[NS_DeliveryAssist1_OBGYN_ALL_OB_FT:[id number]=]

## 2023-10-25 NOTE — BRIEF OPERATIVE NOTE - OPERATION/FINDINGS
Pfannenstiel incision. LTCS. Normal appearing uterus, bilateral tubes, and ovaries. Viable male delivered APGARS 9/9, weighing 3480g

## 2023-10-25 NOTE — OB PROVIDER H&P - HISTORY OF PRESENT ILLNESS
Size Of Lesion In Cm: 1.8 Pt is a 28y.o.  @ 39wks presents for repeat . Pt denies ctx, LOF or VB and reports good FM  Pt is a 28y.o.  @ [facility] presents for repeat . Pt denies ctx, LOF or VB and reports good FM     Pt has h/o Asthma- uses Albuterol PRN, has not used recently, denies H/I  h/o Gastric sleeve

## 2023-10-25 NOTE — OB PROVIDER H&P - NSHPLABSRESULTS_GEN_ALL_CORE
NIPT low risk    Sono @ 34.5wks S=D, EFW: 2648g, [id number] (64%) BPP 10/10  Sono @ 30.5wks S=D, EFW: 1749g, 2br27dm (60%) BPP 8/8  Sono @ 28wks post placenta S=D, EFW: 1256g, 1rn98lk (62%), NPP 8/8  Sono @ 22wks S=D, nl anatomy  Sono @ 20.1wks S=D, nl anatomy, 3 vessel cord, post placenta, suboptimal cardiac   Sono @ 12.1wks S=D, NT WNL  Sono @ 10.5wks S=D  Sono @ 5wks S=D    HGB A1C 5.8  2 hr PP 94

## 2023-10-25 NOTE — OB PROVIDER DELIVERY SUMMARY - NSLOWPPHRISK_OBGYN_A_OB
Sprague Pregnancy/Less than or equal to 4 previous vaginal births/No known bleeding disorder/No history of postpartum hemorrhage/No other PPH risks indicated

## 2023-10-25 NOTE — PROGRESS NOTE ADULT - SUBJECTIVE AND OBJECTIVE BOX
[id number]  Chief Complaint: Postpartum    HPI: Pt doing well, pain well controlled. No overnight events, no acute complaints.   Ambulating: yno  Voiding: auguste in place  Diet: regular  Breastfeeding: yes  Denies HA, lightheadedness, palpitations, N/V, fevers, chills, CP, SOB, LE edema, heavy vaginal bleeding.     PAST MEDICAL & SURGICAL HISTORY:  Asthma  H/O gastric sleeve      Physical Exam  Vital Signs Last 24 Hrs  T(F): 97.7 (25 Oct 2023 15:15), Max: 97.7 (25 Oct 2023 15:15)  HR: 67 (25 Oct 2023 15:15) (67 - 110)  BP: 112/55 (25 Oct 2023 15:15) (84/67 - 119/59)  RR: 16 (25 Oct 2023 15:15) (16 - 20)    Physical exam:  General - AAOx3, NAD  Heart - S1S2, RRR  Lungs - CTA BL  Abdomen:  - Soft, nontender, nondistended, BS+  - Fundus firm, nontender, below the umbilicus  - KARL in place, C/D/I  Pelvis/Vagina - Normal rubra lochia  Extremities - No calf tenderness, no swelling    Labs:                        12.2   7.88  )-----------( 267      ( 25 Oct 2023 07:33 )             38.0     ABO RH Interpretation: O POS (10-25-23 @ 07:33)  Antibody Screen: NEG (10-25-23 @ 07:33)    Prenatal Syphilis Test: Nonreact (10-25-23 @ 07:33)      acetaminophen     Tablet .. 975 milliGRAM(s) Oral <User Schedule>, Routine  dexAMETHasone  Injectable 4 milliGRAM(s) IV Push every 6 hours, Routine PRN Nausea  diphenhydrAMINE 25 milliGRAM(s) Oral every 6 hours, Routine PRN Pruritus  diphtheria/tetanus/pertussis (acellular) Vaccine (Adacel) 0.5 milliLiter(s) IntraMuscular once, Now  ketorolac   Injectable 30 milliGRAM(s) IV Push every 6 hours, Routine  lactated ringers. 1000 milliLiter(s) (125 mL/Hr) IV Continuous <Continuous>, Routine  lanolin Ointment 1 Application(s) Topical every 6 hours, Routine PRN Sore Nipples  magnesium hydroxide Suspension 30 milliLiter(s) Oral two times a day, Routine PRN Constipation  morphine PF Spinal 0.15 milliGRAM(s) IntraThecal. once, Routine  naloxone Injectable 0.1 milliGRAM(s) IV Push every 3 minutes, Routine PRN For ANY of the following changes in patient status:  A. Breaths Per Minute LESS THAN 10, B. Oxygen saturation LESS THAN 90%, C. Sedation score of 6 for Stop After: 4 Times  ondansetron Injectable 4 milliGRAM(s) IV Push every 6 hours, Routine PRN Nausea  oxyCODONE    IR 5 milliGRAM(s) Oral once, Routine PRN Moderate to Severe Pain (4-10)  oxyCODONE    IR 5 milliGRAM(s) Oral every 3 hours, Routine PRN Moderate to Severe Pain (4-10)  oxytocin Infusion 333.333 milliUNIT(s)/Min (1000 mL/Hr) IV Continuous <Continuous>, Routine  simethicone 80 milliGRAM(s) Chew every 4 hours, Routine PRN Gas             [id number]  Chief Complaint: Postpartum    HPI: Pt doing well, pain well controlled. No overnight events, no acute complaints.   Ambulating: yno  Voiding: auguste in place  Diet: regular  Breastfeeding: yes  Denies HA, lightheadedness, palpitations, N/V, fevers, chills, CP, SOB, LE edema, heavy vaginal bleeding.     PAST MEDICAL & SURGICAL HISTORY:  Asthma  H/O gastric sleeve      Physical Exam  Vital Signs Last 24 Hrs  T(F): 97.7 (25 Oct 2023 15:15), Max: 97.7 (25 Oct 2023 15:15)  HR: 67 (25 Oct 2023 15:15) (67 - 110)  BP: 112/55 (25 Oct 2023 15:15) (84/67 - 119/59)  RR: 16 (25 Oct 2023 15:15) (16 - 20)    Physical exam:  General - AAOx3, NAD  Heart - S1S2, RRR  Lungs - CTA BL  Abdomen:  - Soft, nontender, nondistended, BS+  - Fundus firm, nontender, below the umbilicus  - KARL in place, C/D/I  Pelvis/Vagina - Normal rubra lochia  Extremities - No calf tenderness, no swelling    Labs:                        12.2   7.88  )-----------( 267      ( 25 Oct 2023 07:33 )             38.0     ABO RH Interpretation: O POS (10-25-23 @ 07:33)  Antibody Screen: NEG (10-25-23 @ 07:33)    Prenatal Syphilis Test: Nonreact (10-25-23 @ 07:33)      acetaminophen     Tablet .. 975 milliGRAM(s) Oral <User Schedule>, Routine  dexAMETHasone  Injectable 4 milliGRAM(s) IV Push every 6 hours, Routine PRN Nausea  diphenhydrAMINE 25 milliGRAM(s) Oral every 6 hours, Routine PRN Pruritus  diphtheria/tetanus/pertussis (acellular) Vaccine (Adacel) 0.5 milliLiter(s) IntraMuscular once, Now  ketorolac   Injectable 30 milliGRAM(s) IV Push every 6 hours, Routine  lactated ringers. 1000 milliLiter(s) (125 mL/Hr) IV Continuous <Continuous>, Routine  lanolin Ointment 1 Application(s) Topical every 6 hours, Routine PRN Sore Nipples  magnesium hydroxide Suspension 30 milliLiter(s) Oral two times a day, Routine PRN Constipation  morphine PF Spinal 0.15 milliGRAM(s) IntraThecal. once, Routine  naloxone Injectable 0.1 milliGRAM(s) IV Push every 3 minutes, Routine PRN For ANY of the following changes in patient status:  A. Breaths Per Minute LESS THAN 10, B. Oxygen saturation LESS THAN 90%, C. Sedation score of 6 for Stop After: 4 Times  ondansetron Injectable 4 milliGRAM(s) IV Push every 6 hours, Routine PRN Nausea  oxyCODONE    IR 5 milliGRAM(s) Oral once, Routine PRN Moderate to Severe Pain (4-10)  oxyCODONE    IR 5 milliGRAM(s) Oral every 3 hours, Routine PRN Moderate to Severe Pain (4-10)  oxytocin Infusion 333.333 milliUNIT(s)/Min (1000 mL/Hr) IV Continuous <Continuous>, Routine  simethicone 80 milliGRAM(s) Chew every 4 hours, Routine PRN Gas             [id number]  Chief Complaint: Postpartum    HPI: Pt doing well, pain well controlled. No overnight events, no acute complaints.   Ambulating: yno  Voiding: auguste in place  Diet: regular  Breastfeeding: yes  Denies HA, lightheadedness, palpitations, N/V, fevers, chills, CP, SOB, LE edema, heavy vaginal bleeding.     PAST MEDICAL & SURGICAL HISTORY:  Asthma  H/O gastric sleeve      Physical Exam  Vital Signs Last 24 Hrs  T(F): 97.7 (25 Oct 2023 15:15), Max: 97.7 (25 Oct 2023 15:15)  HR: 67 (25 Oct 2023 15:15) (67 - 110)  BP: 112/55 (25 Oct 2023 15:15) (84/67 - 119/59)  RR: 16 (25 Oct 2023 15:15) (16 - 20)    Physical exam:  General - AAOx3, NAD  Heart - S1S2, RRR  Lungs - CTA BL  Abdomen:  - Soft, nontender, nondistended, BS+  - Fundus firm, nontender, below the umbilicus  - KARL in place, C/D/I  Pelvis/Vagina - Normal rubra lochia  Extremities - No calf tenderness, no swelling    Labs:                        12.2   7.88  )-----------( 267      ( 25 Oct 2023 07:33 )             38.0     ABO RH Interpretation: O POS (10-25-23 @ 07:33)  Antibody Screen: NEG (10-25-23 @ 07:33)    Prenatal Syphilis Test: Nonreact (10-25-23 @ 07:33)      acetaminophen     Tablet .. 975 milliGRAM(s) Oral <User Schedule>, Routine  dexAMETHasone  Injectable 4 milliGRAM(s) IV Push every 6 hours, Routine PRN Nausea  diphenhydrAMINE 25 milliGRAM(s) Oral every 6 hours, Routine PRN Pruritus  diphtheria/tetanus/pertussis (acellular) Vaccine (Adacel) 0.5 milliLiter(s) IntraMuscular once, Now  ketorolac   Injectable 30 milliGRAM(s) IV Push every 6 hours, Routine  lactated ringers. 1000 milliLiter(s) (125 mL/Hr) IV Continuous <Continuous>, Routine  lanolin Ointment 1 Application(s) Topical every 6 hours, Routine PRN Sore Nipples  magnesium hydroxide Suspension 30 milliLiter(s) Oral two times a day, Routine PRN Constipation  morphine PF Spinal 0.15 milliGRAM(s) IntraThecal. once, Routine  naloxone Injectable 0.1 milliGRAM(s) IV Push every 3 minutes, Routine PRN For ANY of the following changes in patient status:  A. Breaths Per Minute LESS THAN 10, B. Oxygen saturation LESS THAN 90%, C. Sedation score of 6 for Stop After: 4 Times  ondansetron Injectable 4 milliGRAM(s) IV Push every 6 hours, Routine PRN Nausea  oxyCODONE    IR 5 milliGRAM(s) Oral once, Routine PRN Moderate to Severe Pain (4-10)  oxyCODONE    IR 5 milliGRAM(s) Oral every 3 hours, Routine PRN Moderate to Severe Pain (4-10)  oxytocin Infusion 333.333 milliUNIT(s)/Min (1000 mL/Hr) IV Continuous <Continuous>, Routine  simethicone 80 milliGRAM(s) Chew every 4 hours, Routine PRN Gas

## 2023-10-25 NOTE — OB RN DELIVERY SUMMARY - NSSELHIDDEN_OBGYN_ALL_OB_FT
[NS_DeliveryAttending1_OBGYN_ALL_OB_FT:[id number]],[NS_DeliveryRN_OBGYN_ALL_OB_FT:[id number]]

## 2023-10-26 ENCOUNTER — TRANSCRIPTION ENCOUNTER (OUTPATIENT)
Age: 28
End: 2023-10-26

## 2023-10-26 LAB
BASOPHILS # BLD AUTO: 0.02 K/UL — SIGNIFICANT CHANGE UP (ref 0–0.2)
BASOPHILS # BLD AUTO: 0.02 K/UL — SIGNIFICANT CHANGE UP (ref 0–0.2)
BASOPHILS # BLD AUTO: 0.03 K/UL — SIGNIFICANT CHANGE UP (ref 0–0.2)
BASOPHILS # BLD AUTO: 0.03 K/UL — SIGNIFICANT CHANGE UP (ref 0–0.2)
BASOPHILS NFR BLD AUTO: 0.1 % — SIGNIFICANT CHANGE UP (ref 0–1)
BASOPHILS NFR BLD AUTO: 0.1 % — SIGNIFICANT CHANGE UP (ref 0–1)
BASOPHILS NFR BLD AUTO: 0.3 % — SIGNIFICANT CHANGE UP (ref 0–1)
BASOPHILS NFR BLD AUTO: 0.3 % — SIGNIFICANT CHANGE UP (ref 0–1)
EOSINOPHIL # BLD AUTO: 0 K/UL — SIGNIFICANT CHANGE UP (ref 0–0.7)
EOSINOPHIL # BLD AUTO: 0 K/UL — SIGNIFICANT CHANGE UP (ref 0–0.7)
EOSINOPHIL # BLD AUTO: 0.04 K/UL — SIGNIFICANT CHANGE UP (ref 0–0.7)
EOSINOPHIL # BLD AUTO: 0.04 K/UL — SIGNIFICANT CHANGE UP (ref 0–0.7)
EOSINOPHIL NFR BLD AUTO: 0 % — SIGNIFICANT CHANGE UP (ref 0–8)
EOSINOPHIL NFR BLD AUTO: 0 % — SIGNIFICANT CHANGE UP (ref 0–8)
EOSINOPHIL NFR BLD AUTO: 0.3 % — SIGNIFICANT CHANGE UP (ref 0–8)
EOSINOPHIL NFR BLD AUTO: 0.3 % — SIGNIFICANT CHANGE UP (ref 0–8)
HCT VFR BLD CALC: 34 % — LOW (ref 37–47)
HCT VFR BLD CALC: 34 % — LOW (ref 37–47)
HCT VFR BLD CALC: 36.5 % — LOW (ref 37–47)
HCT VFR BLD CALC: 36.5 % — LOW (ref 37–47)
HGB BLD-MCNC: 10.9 G/DL — LOW (ref 12–16)
HGB BLD-MCNC: 10.9 G/DL — LOW (ref 12–16)
HGB BLD-MCNC: 11.7 G/DL — LOW (ref 12–16)
HGB BLD-MCNC: 11.7 G/DL — LOW (ref 12–16)
IMM GRANULOCYTES NFR BLD AUTO: 0.4 % — HIGH (ref 0.1–0.3)
IMM GRANULOCYTES NFR BLD AUTO: 0.4 % — HIGH (ref 0.1–0.3)
IMM GRANULOCYTES NFR BLD AUTO: 0.7 % — HIGH (ref 0.1–0.3)
IMM GRANULOCYTES NFR BLD AUTO: 0.7 % — HIGH (ref 0.1–0.3)
LYMPHOCYTES # BLD AUTO: 0.96 K/UL — LOW (ref 1.2–3.4)
LYMPHOCYTES # BLD AUTO: 0.96 K/UL — LOW (ref 1.2–3.4)
LYMPHOCYTES # BLD AUTO: 17.5 % — LOW (ref 20.5–51.1)
LYMPHOCYTES # BLD AUTO: 17.5 % — LOW (ref 20.5–51.1)
LYMPHOCYTES # BLD AUTO: 2.04 K/UL — SIGNIFICANT CHANGE UP (ref 1.2–3.4)
LYMPHOCYTES # BLD AUTO: 2.04 K/UL — SIGNIFICANT CHANGE UP (ref 1.2–3.4)
LYMPHOCYTES # BLD AUTO: 6.8 % — LOW (ref 20.5–51.1)
LYMPHOCYTES # BLD AUTO: 6.8 % — LOW (ref 20.5–51.1)
MCHC RBC-ENTMCNC: 26.2 PG — LOW (ref 27–31)
MCHC RBC-ENTMCNC: 26.2 PG — LOW (ref 27–31)
MCHC RBC-ENTMCNC: 26.3 PG — LOW (ref 27–31)
MCHC RBC-ENTMCNC: 26.3 PG — LOW (ref 27–31)
MCHC RBC-ENTMCNC: 32.1 G/DL — SIGNIFICANT CHANGE UP (ref 32–37)
MCV RBC AUTO: 81.7 FL — SIGNIFICANT CHANGE UP (ref 81–99)
MCV RBC AUTO: 81.7 FL — SIGNIFICANT CHANGE UP (ref 81–99)
MCV RBC AUTO: 81.9 FL — SIGNIFICANT CHANGE UP (ref 81–99)
MCV RBC AUTO: 81.9 FL — SIGNIFICANT CHANGE UP (ref 81–99)
MONOCYTES # BLD AUTO: 0.75 K/UL — HIGH (ref 0.1–0.6)
MONOCYTES # BLD AUTO: 0.75 K/UL — HIGH (ref 0.1–0.6)
MONOCYTES # BLD AUTO: 0.87 K/UL — HIGH (ref 0.1–0.6)
MONOCYTES # BLD AUTO: 0.87 K/UL — HIGH (ref 0.1–0.6)
MONOCYTES NFR BLD AUTO: 5.3 % — SIGNIFICANT CHANGE UP (ref 1.7–9.3)
MONOCYTES NFR BLD AUTO: 5.3 % — SIGNIFICANT CHANGE UP (ref 1.7–9.3)
MONOCYTES NFR BLD AUTO: 7.4 % — SIGNIFICANT CHANGE UP (ref 1.7–9.3)
MONOCYTES NFR BLD AUTO: 7.4 % — SIGNIFICANT CHANGE UP (ref 1.7–9.3)
NEUTROPHILS # BLD AUTO: 12.37 K/UL — HIGH (ref 1.4–6.5)
NEUTROPHILS # BLD AUTO: 12.37 K/UL — HIGH (ref 1.4–6.5)
NEUTROPHILS # BLD AUTO: 8.63 K/UL — HIGH (ref 1.4–6.5)
NEUTROPHILS # BLD AUTO: 8.63 K/UL — HIGH (ref 1.4–6.5)
NEUTROPHILS NFR BLD AUTO: 73.8 % — SIGNIFICANT CHANGE UP (ref 42.2–75.2)
NEUTROPHILS NFR BLD AUTO: 73.8 % — SIGNIFICANT CHANGE UP (ref 42.2–75.2)
NEUTROPHILS NFR BLD AUTO: 87.4 % — HIGH (ref 42.2–75.2)
NEUTROPHILS NFR BLD AUTO: 87.4 % — HIGH (ref 42.2–75.2)
NRBC # BLD: 0 /100 WBCS — SIGNIFICANT CHANGE UP (ref 0–0)
PLATELET # BLD AUTO: 262 K/UL — SIGNIFICANT CHANGE UP (ref 130–400)
PLATELET # BLD AUTO: 262 K/UL — SIGNIFICANT CHANGE UP (ref 130–400)
PLATELET # BLD AUTO: 270 K/UL — SIGNIFICANT CHANGE UP (ref 130–400)
PLATELET # BLD AUTO: 270 K/UL — SIGNIFICANT CHANGE UP (ref 130–400)
PMV BLD: 10.9 FL — HIGH (ref 7.4–10.4)
PMV BLD: 10.9 FL — HIGH (ref 7.4–10.4)
PMV BLD: 11 FL — HIGH (ref 7.4–10.4)
PMV BLD: 11 FL — HIGH (ref 7.4–10.4)
RBC # BLD: 4.15 M/UL — LOW (ref 4.2–5.4)
RBC # BLD: 4.15 M/UL — LOW (ref 4.2–5.4)
RBC # BLD: 4.47 M/UL — SIGNIFICANT CHANGE UP (ref 4.2–5.4)
RBC # BLD: 4.47 M/UL — SIGNIFICANT CHANGE UP (ref 4.2–5.4)
RBC # FLD: 13.1 % — SIGNIFICANT CHANGE UP (ref 11.5–14.5)
RBC # FLD: 13.1 % — SIGNIFICANT CHANGE UP (ref 11.5–14.5)
RBC # FLD: 13.4 % — SIGNIFICANT CHANGE UP (ref 11.5–14.5)
RBC # FLD: 13.4 % — SIGNIFICANT CHANGE UP (ref 11.5–14.5)
WBC # BLD: 11.69 K/UL — HIGH (ref 4.8–10.8)
WBC # BLD: 11.69 K/UL — HIGH (ref 4.8–10.8)
WBC # BLD: 14.15 K/UL — HIGH (ref 4.8–10.8)
WBC # BLD: 14.15 K/UL — HIGH (ref 4.8–10.8)
WBC # FLD AUTO: 11.69 K/UL — HIGH (ref 4.8–10.8)
WBC # FLD AUTO: 11.69 K/UL — HIGH (ref 4.8–10.8)
WBC # FLD AUTO: 14.15 K/UL — HIGH (ref 4.8–10.8)
WBC # FLD AUTO: 14.15 K/UL — HIGH (ref 4.8–10.8)

## 2023-10-26 RX ORDER — OXYCODONE HYDROCHLORIDE 5 MG/1
5 TABLET ORAL
Refills: 0 | Status: DISCONTINUED | OUTPATIENT
Start: 2023-10-26 | End: 2023-10-27

## 2023-10-26 RX ORDER — SODIUM CHLORIDE 9 MG/ML
500 INJECTION, SOLUTION INTRAVENOUS ONCE
Refills: 0 | Status: COMPLETED | OUTPATIENT
Start: 2023-10-26 | End: 2023-10-26

## 2023-10-26 RX ORDER — FERROUS SULFATE 325(65) MG
325 TABLET ORAL DAILY
Refills: 0 | Status: DISCONTINUED | OUTPATIENT
Start: 2023-10-26 | End: 2023-10-27

## 2023-10-26 RX ORDER — CHOLECALCIFEROL (VITAMIN D3) 125 MCG
2000 CAPSULE ORAL DAILY
Refills: 0 | Status: DISCONTINUED | OUTPATIENT
Start: 2023-10-26 | End: 2023-10-27

## 2023-10-26 RX ORDER — MAGNESIUM HYDROXIDE 400 MG/1
30 TABLET, CHEWABLE ORAL
Qty: 0 | Refills: 0 | DISCHARGE
Start: 2023-10-26

## 2023-10-26 RX ORDER — ASCORBIC ACID 60 MG
500 TABLET,CHEWABLE ORAL DAILY
Refills: 0 | Status: DISCONTINUED | OUTPATIENT
Start: 2023-10-26 | End: 2023-10-27

## 2023-10-26 RX ORDER — SIMETHICONE 80 MG/1
1 TABLET, CHEWABLE ORAL
Qty: 0 | Refills: 0 | DISCHARGE
Start: 2023-10-26

## 2023-10-26 RX ORDER — ZINC SULFATE TAB 220 MG (50 MG ZINC EQUIVALENT) 220 (50 ZN) MG
220 TAB ORAL DAILY
Refills: 0 | Status: DISCONTINUED | OUTPATIENT
Start: 2023-10-26 | End: 2023-10-27

## 2023-10-26 RX ORDER — DIPHENHYDRAMINE HCL 50 MG
25 CAPSULE ORAL EVERY 6 HOURS
Refills: 0 | Status: DISCONTINUED | OUTPATIENT
Start: 2023-10-26 | End: 2023-10-27

## 2023-10-26 RX ORDER — ACETAMINOPHEN 500 MG
3 TABLET ORAL
Qty: 0 | Refills: 0 | DISCHARGE
Start: 2023-10-26

## 2023-10-26 RX ORDER — FOLIC ACID 0.8 MG
1 TABLET ORAL DAILY
Refills: 0 | Status: DISCONTINUED | OUTPATIENT
Start: 2023-10-26 | End: 2023-10-27

## 2023-10-26 RX ADMIN — Medication 975 MILLIGRAM(S): at 18:20

## 2023-10-26 RX ADMIN — Medication 975 MILLIGRAM(S): at 03:30

## 2023-10-26 RX ADMIN — Medication 30 MILLIGRAM(S): at 06:24

## 2023-10-26 RX ADMIN — Medication 975 MILLIGRAM(S): at 03:08

## 2023-10-26 RX ADMIN — ZINC SULFATE TAB 220 MG (50 MG ZINC EQUIVALENT) 220 MILLIGRAM(S): 220 (50 ZN) TAB at 12:58

## 2023-10-26 RX ADMIN — Medication 500 MILLIGRAM(S): at 12:59

## 2023-10-26 RX ADMIN — Medication 1 MILLIGRAM(S): at 12:58

## 2023-10-26 RX ADMIN — OXYCODONE HYDROCHLORIDE 5 MILLIGRAM(S): 5 TABLET ORAL at 21:10

## 2023-10-26 RX ADMIN — Medication 325 MILLIGRAM(S): at 12:58

## 2023-10-26 RX ADMIN — Medication 1 TABLET(S): at 12:57

## 2023-10-26 RX ADMIN — Medication 2000 UNIT(S): at 12:57

## 2023-10-26 RX ADMIN — Medication 30 MILLIGRAM(S): at 06:01

## 2023-10-26 RX ADMIN — Medication 975 MILLIGRAM(S): at 09:19

## 2023-10-26 RX ADMIN — SODIUM CHLORIDE 500 MILLILITER(S): 9 INJECTION, SOLUTION INTRAVENOUS at 05:00

## 2023-10-26 RX ADMIN — ENOXAPARIN SODIUM 40 MILLIGRAM(S): 100 INJECTION SUBCUTANEOUS at 06:01

## 2023-10-26 RX ADMIN — OXYCODONE HYDROCHLORIDE 5 MILLIGRAM(S): 5 TABLET ORAL at 20:47

## 2023-10-26 RX ADMIN — Medication 975 MILLIGRAM(S): at 17:52

## 2023-10-26 RX ADMIN — SIMETHICONE 80 MILLIGRAM(S): 80 TABLET, CHEWABLE ORAL at 20:47

## 2023-10-26 RX ADMIN — ENOXAPARIN SODIUM 40 MILLIGRAM(S): 100 INJECTION SUBCUTANEOUS at 17:57

## 2023-10-26 NOTE — DISCHARGE NOTE OB - NSTOBACCONEVERSMOKERY/N_GEN_A
Today I called Marian Mondragon re: unassigned pcp - [facility] [employer] insurance.  The outcome was Unable to reach patient. A voicemail message has been left for a return call via .  [facility] [employer] outreach outcome:  Unable to make appointment: Unable to reach after 3 attempts   No

## 2023-10-26 NOTE — PROGRESS NOTE ADULT - SUBJECTIVE AND OBJECTIVE BOX
PGY2 note  Chief Complaint: Post  section    Patient seen and examined. Pain well controlled at this time. No complaints at this time. Denies fever, chills, nausea, vomiting, chest pain, shortness of breath, severe abdominal pain, heavy vaginal bleeding.     Patient is ambulating.   Passing flatus, Denies bowel movement.   Diet: Regular, tolerating PO  Voiding: auguste catheter in place     PAST MEDICAL & SURGICAL HISTORY:  Asthma  H/O gastric sleeve    MEDICATIONS  (STANDING):  acetaminophen     Tablet .. 975 milliGRAM(s) Oral <User Schedule>  ascorbic acid 500 milliGRAM(s) Oral daily  cholecalciferol 2000 Unit(s) Oral daily  diphtheria/tetanus/pertussis (acellular) Vaccine (Adacel) 0.5 milliLiter(s) IntraMuscular once  enoxaparin Injectable 40 milliGRAM(s) SubCutaneous every 12 hours  ferrous    sulfate 325 milliGRAM(s) Oral daily  folic acid 1 milliGRAM(s) Oral daily  lactated ringers Bolus 500 milliLiter(s) IV Bolus once  lactated ringers. 1000 milliLiter(s) (125 mL/Hr) IV Continuous <Continuous>  morphine PF Spinal 0.15 milliGRAM(s) IntraThecal. once  oxytocin Infusion 333.333 milliUNIT(s)/Min (1000 mL/Hr) IV Continuous <Continuous>  prenatal multivitamin 1 Tablet(s) Oral daily  zinc sulfate 220 milliGRAM(s) Oral daily    MEDICATIONS  (PRN):  dexAMETHasone  Injectable 4 milliGRAM(s) IV Push every 6 hours PRN Nausea  diphenhydrAMINE 25 milliGRAM(s) Oral every 6 hours PRN Pruritus  lanolin Ointment 1 Application(s) Topical every 6 hours PRN Sore Nipples  magnesium hydroxide Suspension 30 milliLiter(s) Oral two times a day PRN Constipation  naloxone Injectable 0.1 milliGRAM(s) IV Push every 3 minutes PRN For ANY of the following changes in patient status:  A. Breaths Per Minute LESS THAN 10, B. Oxygen saturation LESS THAN 90%, C. Sedation score of 6 for Stop After: 4 Times  ondansetron Injectable 4 milliGRAM(s) IV Push every 6 hours PRN Nausea  oxyCODONE    IR 5 milliGRAM(s) Oral once PRN Moderate to Severe Pain (4-10)  oxyCODONE    IR 5 milliGRAM(s) Oral every 3 hours PRN Moderate to Severe Pain (4-10)  simethicone 80 milliGRAM(s) Chew every 4 hours PRN Gas    Physical Exam  Vital Signs Last 24 Hrs  T(F): 98.2 (26 Oct 2023 04:50), Max: 98.2 (26 Oct 2023 04:50)  HR: 76 (26 Oct 2023 04:50) (67 - 110)  BP: 100/58 (26 Oct 2023 04:50) (84/67 - 119/59)  RR: 18 (26 Oct 2023 04:50) (16 - 20)    Physical exam:  General - AAOx3, NAD  Heart - S1S2, RRR  Lungs - CTA BL  Abdomen:  - Soft, appropriately tender, mildly distended, BS+. KARL dressing c/d/i in place over pfannenstiel skin incision.  - Fundus firm, appropriately tender, below the umbilicus  - No rebound or guarding  Pelvis/Vagina - Normal Lochia  Extremities - No calf tenderness, no swelling    Labs:                        11.7   14.15 )-----------( 262      ( 25 Oct 2023 23:07 )             36.5                         12.2   7.88  )-----------( 267      ( 25 Oct 2023 07:33 )             38.0     Antibody Screen: NEG (10-25-23 @ 07:33)    Prenatal Syphilis Test: Nonreact (10-25-23 @ 07:33)    Antibody Screen: NEG (10-25-23 @ 07:33)

## 2023-10-26 NOTE — DISCHARGE NOTE OB - CARE PROVIDER_API CALL
Romero Davis  Obstetrics and Gynecology  [address] [place] [id number]  Phone: [phone]  Fax: [phone]  Follow Up Time:

## 2023-10-26 NOTE — DISCHARGE NOTE OB - PATIENT PORTAL LINK FT
You can access the FollowMyHealth Patient Portal offered by [facility] by registering at the following website: [email or web address]. By joining [facility]’s FollowMyHealth portal, you will also be able to view your health information using other applications (apps) compatible with our system.

## 2023-10-26 NOTE — DISCHARGE NOTE OB - MEDICATION SUMMARY - MEDICATIONS TO TAKE
I will START or STAY ON the medications listed below when I get home from the hospital:    acetaminophen 325 mg oral tablet  -- 3 tab(s) by mouth every 8 hours as needed for pain  -- Indication: For pain    magnesium hydroxide 8% oral suspension  -- 30 milliliter(s) by mouth 2 times a day As needed Constipation  -- Indication: For constipation    simethicone 80 mg oral tablet, chewable  -- 1 tab(s) by mouth every 4 hours As needed Gas  -- Indication: For gas

## 2023-10-26 NOTE — DISCHARGE NOTE OB - PLAN OF CARE
No heavy lifting. Nothing in the vagina for 6 weeks:  No tampons, douching, sexual intercourse, tub baths or pools. May shower.  If fever 100.4F or greater, heavy vaginal bleeding, or severe abdominal pain, call your Ob/Gyn or come to ED or call 911.  Please see your doctor in 1 week for incision check and in 6 weeks for your regular PP visit.

## 2023-10-26 NOTE — DISCHARGE NOTE OB - CARE PLAN
Principal Discharge DX:	 delivery delivered  Assessment and plan of treatment:	No heavy lifting. Nothing in the vagina for 6 weeks:  No tampons, douching, sexual intercourse, tub baths or pools. May shower.  If fever 100.4F or greater, heavy vaginal bleeding, or severe abdominal pain, call your Ob/Gyn or come to ED or call 911.  Please see your doctor in 1 week for incision check and in 6 weeks for your regular PP visit.   1

## 2023-10-27 VITALS
SYSTOLIC BLOOD PRESSURE: 138 MMHG | RESPIRATION RATE: 18 BRPM | TEMPERATURE: 98 F | HEART RATE: 101 BPM | DIASTOLIC BLOOD PRESSURE: 75 MMHG

## 2023-10-27 LAB
SURGICAL PATHOLOGY STUDY: SIGNIFICANT CHANGE UP
SURGICAL PATHOLOGY STUDY: SIGNIFICANT CHANGE UP

## 2023-10-27 RX ORDER — GABAPENTIN 400 MG/1
1 CAPSULE ORAL
Qty: 15 | Refills: 0
Start: 2023-10-27 | End: 2023-10-31

## 2023-10-27 RX ORDER — FERROUS SULFATE 325(65) MG
1 TABLET ORAL
Qty: 30 | Refills: 0
Start: 2023-10-27

## 2023-10-27 RX ORDER — SIMETHICONE 80 MG/1
1 TABLET, CHEWABLE ORAL
Qty: 15 | Refills: 0
Start: 2023-10-27

## 2023-10-27 RX ORDER — ACETAMINOPHEN 500 MG
3 TABLET ORAL
Qty: 30 | Refills: 0
Start: 2023-10-27

## 2023-10-27 RX ORDER — ASCORBIC ACID 60 MG
1 TABLET,CHEWABLE ORAL
Qty: 30 | Refills: 0
Start: 2023-10-27

## 2023-10-27 RX ORDER — GABAPENTIN 400 MG/1
100 CAPSULE ORAL THREE TIMES A DAY
Refills: 0 | Status: DISCONTINUED | OUTPATIENT
Start: 2023-10-27 | End: 2023-10-27

## 2023-10-27 RX ORDER — OXYCODONE HYDROCHLORIDE 5 MG/1
1 TABLET ORAL
Qty: 12 | Refills: 0
Start: 2023-10-27 | End: 2023-10-29

## 2023-10-27 RX ORDER — GABAPENTIN 400 MG/1
1 CAPSULE ORAL
Qty: 21 | Refills: 0
Start: 2023-10-27 | End: 2023-11-02

## 2023-10-27 RX ADMIN — GABAPENTIN 100 MILLIGRAM(S): 400 CAPSULE ORAL at 06:37

## 2023-10-27 RX ADMIN — ZINC SULFATE TAB 220 MG (50 MG ZINC EQUIVALENT) 220 MILLIGRAM(S): 220 (50 ZN) TAB at 12:32

## 2023-10-27 RX ADMIN — OXYCODONE HYDROCHLORIDE 5 MILLIGRAM(S): 5 TABLET ORAL at 12:41

## 2023-10-27 RX ADMIN — Medication 975 MILLIGRAM(S): at 17:27

## 2023-10-27 RX ADMIN — ENOXAPARIN SODIUM 40 MILLIGRAM(S): 100 INJECTION SUBCUTANEOUS at 06:26

## 2023-10-27 RX ADMIN — Medication 325 MILLIGRAM(S): at 12:31

## 2023-10-27 RX ADMIN — Medication 975 MILLIGRAM(S): at 01:00

## 2023-10-27 RX ADMIN — Medication 2000 UNIT(S): at 12:32

## 2023-10-27 RX ADMIN — GABAPENTIN 100 MILLIGRAM(S): 400 CAPSULE ORAL at 13:58

## 2023-10-27 RX ADMIN — Medication 1 TABLET(S): at 12:31

## 2023-10-27 RX ADMIN — Medication 975 MILLIGRAM(S): at 00:36

## 2023-10-27 RX ADMIN — OXYCODONE HYDROCHLORIDE 5 MILLIGRAM(S): 5 TABLET ORAL at 13:15

## 2023-10-27 RX ADMIN — Medication 1 MILLIGRAM(S): at 12:31

## 2023-10-27 RX ADMIN — Medication 975 MILLIGRAM(S): at 09:01

## 2023-10-27 RX ADMIN — Medication 975 MILLIGRAM(S): at 17:39

## 2023-10-27 RX ADMIN — Medication 500 MILLIGRAM(S): at 12:31

## 2023-10-27 NOTE — PROGRESS NOTE ADULT - ASSESSMENT
28y now P2, s/p repeat LTCS #2, POD 2, , KARL in place, hx of asthma and gastric sleeve, recovering well.    - pain management with PO pain meds, gabapentin ordered for likely neuropathic pain   - monitor vitals/bleeding   - encourage incentive spirometry   - ambulation/PO hydration  - advance diet as tolerated   - SCDs/lovenox for DVT prophylaxis   - routine postop care     Case discussed with chief resident.   
28y now P2, s/p repeat LTCS #2, POD 1, , KARL in place , hx of asthma and gastric sleeve, recovering well.    - pain management with PO pain meds   - monitor vitals/bleeding   - encourage incentive spirometry   - ambulation/PO hydration  - advance diet as tolerated   - SCDs/lovenox for DVT prophylaxis   - f/u 1100 AM labs  - routine postop care     Dr. Davis aware
28y now P2, s/p repeat LTCS #2, POD 0, , KARL in place, , hx of asthma and gastric sleeve, recovering well.    - pain management with PO pain meds   - monitor vitals/bleeding   - encourage incentive spirometry   - ambulation/PO hydration  - advance diet as tolerated   - SCDs/lovenox for DVT prophylaxis   - f/u 2000 labs   - routine postop care

## 2023-10-27 NOTE — PROGRESS NOTE ADULT - SUBJECTIVE AND OBJECTIVE BOX
MARILYN WATERMAN  28y  Female    PGY1 Note:    Patient seen and examined bedside. Pain not well-controlled, endorses severe 10/10 "burning" pain on her skin around the incision site. Denies heavy vaginal bleeding. Denies headaches, vision changes, dizziness, lightheadedness, CP, SOB, palpitations, RUQ pain. Denies fever, chills, nausea/vomiting. Ambulating, tolerating diet, voiding, passing flatus, no BM. Breastfeeding.     Physical Exam  Vital Signs Last 24 Hrs  T(C): 36.8 (27 Oct 2023 03:53), Max: 36.9 (26 Oct 2023 15:35)  T(F): 98.2 (27 Oct 2023 03:53), Max: 98.4 (26 Oct 2023 15:35)  HR: 83 (27 Oct 2023 03:53) (70 - 98)  BP: 99/55 (27 Oct 2023 03:53) (88/47 - 110/68)  RR: 18 (27 Oct 2023 03:53) (18 - 18)    Gen: NAD, sitting comfortably  CV: RRR. No murmurs gallops or rubs.  Pulm: CTAB. No wheezes or rales.  Ext: No calf tenderness, no swelling.   Abd: Nondistended, soft, nontender, BS+, fundus firm, and below umbilicus.   Lochia: Minimal rubra  Wound: KARL in place. No surrounding edema or erythema.    PAST MEDICAL & SURGICAL HISTORY:  Asthma  H/O gastric sleeve    Diet: regular    Labs:                        10.9   11.69 )-----------( 270      ( 26 Oct 2023 12:09 )             34.0                         11.7   14.15 )-----------( 262      ( 25 Oct 2023 23:07 )             36.5          acetaminophen     Tablet .. 975 milliGRAM(s) Oral <User Schedule>  ascorbic acid 500 milliGRAM(s) Oral daily  cholecalciferol 2000 Unit(s) Oral daily  dexAMETHasone  Injectable 4 milliGRAM(s) IV Push every 6 hours PRN  diphenhydrAMINE 25 milliGRAM(s) Oral every 6 hours PRN  diphtheria/tetanus/pertussis (acellular) Vaccine (Adacel) 0.5 milliLiter(s) IntraMuscular once  enoxaparin Injectable 40 milliGRAM(s) SubCutaneous every 12 hours  ferrous    sulfate 325 milliGRAM(s) Oral daily  folic acid 1 milliGRAM(s) Oral daily  gabapentin 100 milliGRAM(s) Oral three times a day  lactated ringers. 1000 milliLiter(s) IV Continuous <Continuous>  lanolin Ointment 1 Application(s) Topical every 6 hours PRN  magnesium hydroxide Suspension 30 milliLiter(s) Oral two times a day PRN  morphine PF Spinal 0.15 milliGRAM(s) IntraThecal. once  naloxone Injectable 0.1 milliGRAM(s) IV Push every 3 minutes PRN  ondansetron Injectable 4 milliGRAM(s) IV Push every 6 hours PRN  oxyCODONE    IR 5 milliGRAM(s) Oral every 3 hours PRN  oxyCODONE    IR 5 milliGRAM(s) Oral once PRN  oxytocin Infusion 333.333 milliUNIT(s)/Min IV Continuous <Continuous>  prenatal multivitamin 1 Tablet(s) Oral daily  simethicone 80 milliGRAM(s) Chew every 4 hours PRN  zinc sulfate 220 milliGRAM(s) Oral daily

## 2023-10-30 PROBLEM — J45.909 UNSPECIFIED ASTHMA, UNCOMPLICATED: Chronic | Status: ACTIVE | Noted: 2023-10-25

## 2023-11-01 ENCOUNTER — APPOINTMENT (OUTPATIENT)
Dept: OBGYN | Facility: CLINIC | Age: 28
End: 2023-11-01
Payer: MEDICAID

## 2023-11-01 VITALS
DIASTOLIC BLOOD PRESSURE: 70 MMHG | OXYGEN SATURATION: 99 % | TEMPERATURE: 98.2 F | WEIGHT: 293 LBS | HEIGHT: 62 IN | HEART RATE: 78 BPM | BODY MASS INDEX: 53.92 KG/M2 | SYSTOLIC BLOOD PRESSURE: 100 MMHG

## 2023-11-01 PROCEDURE — 99024 POSTOP FOLLOW-UP VISIT: CPT

## 2023-11-08 DIAGNOSIS — Z91.013 ALLERGY TO SEAFOOD: ICD-10-CM

## 2023-11-08 DIAGNOSIS — O34.211 MATERNAL CARE FOR LOW TRANSVERSE SCAR FROM PREVIOUS CESAREAN DELIVERY: ICD-10-CM

## 2023-11-08 DIAGNOSIS — Z91.018 ALLERGY TO OTHER FOODS: ICD-10-CM

## 2023-11-08 DIAGNOSIS — Z3A.39 39 WEEKS GESTATION OF PREGNANCY: ICD-10-CM

## 2023-11-08 DIAGNOSIS — Z28.09 IMMUNIZATION NOT CARRIED OUT BECAUSE OF OTHER CONTRAINDICATION: ICD-10-CM

## 2023-11-20 ENCOUNTER — APPOINTMENT (OUTPATIENT)
Dept: OBGYN | Facility: CLINIC | Age: 28
End: 2023-11-20
Payer: MEDICAID

## 2023-11-20 VITALS
BODY MASS INDEX: 52.08 KG/M2 | HEART RATE: 88 BPM | DIASTOLIC BLOOD PRESSURE: 80 MMHG | WEIGHT: 283 LBS | HEIGHT: 62 IN | OXYGEN SATURATION: 98 % | TEMPERATURE: 98 F | SYSTOLIC BLOOD PRESSURE: 118 MMHG

## 2023-11-20 DIAGNOSIS — Z98.891 HISTORY OF UTERINE SCAR FROM PREVIOUS SURGERY: ICD-10-CM

## 2023-11-20 PROCEDURE — 99213 OFFICE O/P EST LOW 20 MIN: CPT

## 2023-12-26 ENCOUNTER — APPOINTMENT (OUTPATIENT)
Dept: OBGYN | Facility: CLINIC | Age: 28
End: 2023-12-26
Payer: MEDICAID

## 2023-12-26 DIAGNOSIS — Z11.3 ENCOUNTER FOR SCREENING FOR INFECTIONS WITH A PREDOMINANTLY SEXUAL MODE OF TRANSMISSION: ICD-10-CM

## 2023-12-26 DIAGNOSIS — Z11.51 ENCOUNTER FOR SCREENING FOR HUMAN PAPILLOMAVIRUS (HPV): ICD-10-CM

## 2023-12-26 DIAGNOSIS — Z12.4 ENCOUNTER FOR SCREENING FOR MALIGNANT NEOPLASM OF CERVIX: ICD-10-CM

## 2023-12-26 PROCEDURE — 0503F POSTPARTUM CARE VISIT: CPT

## 2023-12-28 LAB
C TRACH RRNA SPEC QL NAA+PROBE: NOT DETECTED
HPV HIGH+LOW RISK DNA PNL CVX: NOT DETECTED
N GONORRHOEA RRNA SPEC QL NAA+PROBE: NOT DETECTED
SOURCE AMPLIFICATION: NORMAL
SOURCE AMPLIFICATION: NORMAL
T VAGINALIS RRNA SPEC QL NAA+PROBE: NOT DETECTED

## 2024-01-04 LAB — CYTOLOGY CVX/VAG DOC THIN PREP: NORMAL

## 2024-01-05 ENCOUNTER — APPOINTMENT (OUTPATIENT)
Dept: OBGYN | Facility: CLINIC | Age: 29
End: 2024-01-05
Payer: MEDICAID

## 2024-01-05 VITALS
TEMPERATURE: 98 F | WEIGHT: 280 LBS | HEIGHT: 62 IN | OXYGEN SATURATION: 99 % | SYSTOLIC BLOOD PRESSURE: 120 MMHG | BODY MASS INDEX: 51.53 KG/M2 | HEART RATE: 75 BPM | DIASTOLIC BLOOD PRESSURE: 80 MMHG

## 2024-01-05 PROCEDURE — 0503F POSTPARTUM CARE VISIT: CPT

## 2024-01-05 NOTE — PLAN
[FreeTextEntry1] : POSTPARTUM FOLLOW UP S/P C/S X2 DUE TO BODY HABITUS ELEVATED BMI/ INSULIN RESISTANT REQUIRES OBSERVATIONAND MEDICAL TREATMENT RTO 6mths/ PRN

## 2024-01-05 NOTE — HISTORY OF PRESENT ILLNESS
[FreeTextEntry1] : PATIENT PRESENT FOR FOLLOW UP VISIT/ POSTPARTUM VISIT [TextBox_4] : PATIENT PRESENT FOR FOLLOW UP EXAM [Mammogramdate] : -0- [BreastSonogramDate] : -0- [PapSmeardate] : 3/1/23 [BoneDensityDate] : -0- [ColonoscopyDate] : -0- [Yes] : pregnancy

## 2024-03-11 ENCOUNTER — APPOINTMENT (OUTPATIENT)
Dept: OBGYN | Facility: CLINIC | Age: 29
End: 2024-03-11
Payer: MEDICAID

## 2024-03-11 VITALS
HEART RATE: 99 BPM | TEMPERATURE: 98.7 F | BODY MASS INDEX: 52.26 KG/M2 | OXYGEN SATURATION: 99 % | WEIGHT: 284 LBS | DIASTOLIC BLOOD PRESSURE: 70 MMHG | HEIGHT: 62 IN | SYSTOLIC BLOOD PRESSURE: 110 MMHG

## 2024-03-11 PROCEDURE — 99212 OFFICE O/P EST SF 10 MIN: CPT

## 2024-03-11 NOTE — COUNSELING
[Vitamins/Supplements] : vitamins/supplements [Nutrition/ Exercise/ Weight Management] : nutrition, exercise, weight management [Contraception/ Emergency Contraception/ Safe Sexual Practices] : contraception, emergency contraception, safe sexual practices

## 2024-03-11 NOTE — PLAN
[FreeTextEntry1] : C/S 10/25/2024 PT DOING WELL HEALTHY DIET D/W/P; VITAMINS QD C/D/E/ NO COMPLAINTS  PT CAN RETUN TO WORK 3/18/2024. RTO 6M    I, DR. FERMÍN TOMPKINS DO, PERSONALLY PERFORMED THE SERVICES DESCRIBED IN THIS DOCUMENTATION ON THIS DATE, MARCH 11,2024 FOR MARILYN WATERMAN   AS SCRIBED BY, RAMESH HU CMA, IN MY PRESENCE. I HAVE REVIEWED AND VERIFIED THAT ALL THE INFORMATION IN ACCURATE AND TRUE.

## 2024-03-11 NOTE — HISTORY OF PRESENT ILLNESS
[Y] : Positive pregnancy history [FreeTextEntry1] : PATIENT PRESENT FOR FOLLOW UP [TextBox_4] : 29 year old female pt present for release to work follow up. pt doing well , can return to work on 3/18/2024  I,  Deborah Baltazar CMA , personally scribed the services dictated  to me by for Dr Romero Davis,  in this documentation on this date  March 11,2024 for Elly Noble.  Deborah Baltazar CMA   [Mammogramdate] : 0 [BreastSonogramDate] : 0 [PapSmeardate] : 12/26/23 [ColonoscopyDate] : 0 [BoneDensityDate] : 0 [LMPDate] : 3/5/24

## 2024-03-18 ENCOUNTER — NON-APPOINTMENT (OUTPATIENT)
Age: 29
End: 2024-03-18

## 2024-04-09 ENCOUNTER — NON-APPOINTMENT (OUTPATIENT)
Age: 29
End: 2024-04-09

## 2024-09-25 NOTE — OB PROVIDER DELIVERY SUMMARY - NSANESTHESIACS_OBGYN_ALL_OB
RECORDS RECEIVED FROM: Internal    REASON FOR VISIT: Difficulty walking   PROVIDER: Rashad Law DO   DATE OF APPT: 11/8/24 @ 8:00 am    NOTES (FOR ALL VISITS) STATUS DETAILS   OFFICE NOTE from referring provider Internal 6/7/24 Rafy Walker MD @[facility]     MEDICATION LIST Internal    IMAGING  (FOR ALL VISITS)     MRI (HEAD, NECK, SPINE) Internal [facility]  7/10/24 MR Lumbar Spine  7/10/24 MR Brain        
Spinal

## 2025-07-02 ENCOUNTER — NON-APPOINTMENT (OUTPATIENT)
Age: 30
End: 2025-07-02